# Patient Record
Sex: MALE | Race: BLACK OR AFRICAN AMERICAN | NOT HISPANIC OR LATINO | Employment: FULL TIME | URBAN - METROPOLITAN AREA
[De-identification: names, ages, dates, MRNs, and addresses within clinical notes are randomized per-mention and may not be internally consistent; named-entity substitution may affect disease eponyms.]

---

## 2022-12-25 ENCOUNTER — APPOINTMENT (EMERGENCY)
Dept: RADIOLOGY | Facility: HOSPITAL | Age: 37
End: 2022-12-25

## 2022-12-25 ENCOUNTER — HOSPITAL ENCOUNTER (EMERGENCY)
Facility: HOSPITAL | Age: 37
Discharge: HOME | End: 2022-12-26
Attending: EMERGENCY MEDICINE

## 2022-12-25 DIAGNOSIS — M79.601 RIGHT ARM PAIN: Primary | ICD-10-CM

## 2022-12-25 DIAGNOSIS — M25.521 RIGHT ELBOW PAIN: ICD-10-CM

## 2022-12-25 LAB
ALBUMIN SERPL BCP-MCNC: 4.3 G/DL (ref 3.5–5)
ALP SERPL-CCNC: 62 U/L (ref 34–104)
ALT SERPL W P-5'-P-CCNC: 14 U/L (ref 7–52)
ANION GAP SERPL CALCULATED.3IONS-SCNC: 2 MMOL/L (ref 4–13)
AST SERPL W P-5'-P-CCNC: 18 U/L (ref 13–39)
BASOPHILS # BLD AUTO: 0.02 THOUSANDS/ÂΜL (ref 0–0.1)
BASOPHILS NFR BLD AUTO: 0 % (ref 0–1)
BILIRUB SERPL-MCNC: 0.32 MG/DL (ref 0.2–1)
BUN SERPL-MCNC: 15 MG/DL (ref 5–25)
CALCIUM SERPL-MCNC: 8.9 MG/DL (ref 8.4–10.2)
CHLORIDE SERPL-SCNC: 105 MMOL/L (ref 96–108)
CK SERPL-CCNC: 187 U/L (ref 39–308)
CO2 SERPL-SCNC: 30 MMOL/L (ref 21–32)
CREAT SERPL-MCNC: 1.08 MG/DL (ref 0.6–1.3)
CRP SERPL QL: <1 MG/L
EOSINOPHIL # BLD AUTO: 0.07 THOUSAND/ÂΜL (ref 0–0.61)
EOSINOPHIL NFR BLD AUTO: 1 % (ref 0–6)
ERYTHROCYTE [DISTWIDTH] IN BLOOD BY AUTOMATED COUNT: 12 % (ref 11.6–15.1)
GFR SERPL CREATININE-BSD FRML MDRD: 88 ML/MIN/1.73SQ M
GLUCOSE SERPL-MCNC: 91 MG/DL (ref 65–140)
HCT VFR BLD AUTO: 44.6 % (ref 36.5–49.3)
HGB BLD-MCNC: 14.5 G/DL (ref 12–17)
IMM GRANULOCYTES # BLD AUTO: 0.02 THOUSAND/UL (ref 0–0.2)
IMM GRANULOCYTES NFR BLD AUTO: 0 % (ref 0–2)
LACTATE SERPL-SCNC: 0.6 MMOL/L (ref 0.5–2)
LYMPHOCYTES # BLD AUTO: 2.85 THOUSANDS/ÂΜL (ref 0.6–4.47)
LYMPHOCYTES NFR BLD AUTO: 43 % (ref 14–44)
MCH RBC QN AUTO: 29.5 PG (ref 26.8–34.3)
MCHC RBC AUTO-ENTMCNC: 32.5 G/DL (ref 31.4–37.4)
MCV RBC AUTO: 91 FL (ref 82–98)
MONOCYTES # BLD AUTO: 0.81 THOUSAND/ÂΜL (ref 0.17–1.22)
MONOCYTES NFR BLD AUTO: 12 % (ref 4–12)
NEUTROPHILS # BLD AUTO: 2.8 THOUSANDS/ÂΜL (ref 1.85–7.62)
NEUTS SEG NFR BLD AUTO: 44 % (ref 43–75)
NRBC BLD AUTO-RTO: 0 /100 WBCS
PLATELET # BLD AUTO: 326 THOUSANDS/UL (ref 149–390)
PMV BLD AUTO: 9.2 FL (ref 8.9–12.7)
POTASSIUM SERPL-SCNC: 4.4 MMOL/L (ref 3.5–5.3)
PROT SERPL-MCNC: 7.2 G/DL (ref 6.4–8.4)
RBC # BLD AUTO: 4.91 MILLION/UL (ref 3.88–5.62)
SODIUM SERPL-SCNC: 137 MMOL/L (ref 135–147)
TSH SERPL DL<=0.05 MIU/L-ACNC: 1.72 UIU/ML (ref 0.45–4.5)
WBC # BLD AUTO: 6.57 THOUSAND/UL (ref 4.31–10.16)

## 2022-12-25 RX ORDER — KETOROLAC TROMETHAMINE 30 MG/ML
30 INJECTION, SOLUTION INTRAMUSCULAR; INTRAVENOUS ONCE
Status: COMPLETED | OUTPATIENT
Start: 2022-12-25 | End: 2022-12-25

## 2022-12-25 RX ADMIN — KETOROLAC TROMETHAMINE 30 MG: 30 INJECTION, SOLUTION INTRAMUSCULAR; INTRAVENOUS at 22:53

## 2022-12-25 RX ADMIN — SODIUM CHLORIDE 1000 ML: 0.9 INJECTION, SOLUTION INTRAVENOUS at 22:53

## 2022-12-25 NOTE — Clinical Note
Redd Wren was seen and treated in our emergency department on 12/25/2022  Diagnosis:     Vinnie Lora    He may return on this date:     No work for one day     If you have any questions or concerns, please don't hesitate to call        Conchita Ly MD    ______________________________           _______________          _______________  Hospital Representative                              Date                                Time

## 2022-12-26 VITALS
HEART RATE: 78 BPM | TEMPERATURE: 98.4 F | SYSTOLIC BLOOD PRESSURE: 130 MMHG | WEIGHT: 208.34 LBS | RESPIRATION RATE: 16 BRPM | DIASTOLIC BLOOD PRESSURE: 76 MMHG | OXYGEN SATURATION: 99 %

## 2022-12-26 LAB
ATRIAL RATE: 71 BPM
CK MB SERPL-MCNC: 0.6 % (ref 0–2.5)
CK MB SERPL-MCNC: 1.2 NG/ML (ref 0.6–6.3)
P AXIS: 57 DEGREES
PR INTERVAL: 174 MS
QRS AXIS: 81 DEGREES
QRSD INTERVAL: 88 MS
QT INTERVAL: 370 MS
QTC INTERVAL: 402 MS
T WAVE AXIS: 12 DEGREES
VENTRICULAR RATE: 71 BPM

## 2022-12-26 RX ORDER — NAPROXEN 500 MG/1
500 TABLET ORAL 2 TIMES DAILY WITH MEALS
Qty: 20 TABLET | Refills: 0 | Status: SHIPPED | OUTPATIENT
Start: 2022-12-26 | End: 2023-01-05

## 2022-12-26 NOTE — ED PROVIDER NOTES
History  Chief Complaint   Patient presents with   • Arm Pain     C/O R arm pain from the elbow down starting yesterday afternoon with pain intensifying two hours ago  Patient states fingers feel swollen  -tingling     Patient is a 30year old male with increased pain and swelling of R elbow and forearm since yesterday after sleeping on it  Denies excess use of arm  No trauma  No fever  No N/V  No sob  No long travel  States he drove here  (+) paresthesias  No recent old records from this ED seen on computer system  [facility] checked on this patient and no Rx found  History provided by:  Patient   used: No    Arm Pain  Associated symptoms: myalgias    Associated symptoms: no fever, no nausea, no shortness of breath and no vomiting        None       History reviewed  No pertinent past medical history  History reviewed  No pertinent surgical history  History reviewed  No pertinent family history  I have reviewed and agree with the history as documented  E-Cigarette/Vaping   • E-Cigarette Use Never User      E-Cigarette/Vaping Substances     Social History     Tobacco Use   • Smoking status: Never   • Smokeless tobacco: Never   Vaping Use   • Vaping Use: Never used   Substance Use Topics   • Alcohol use: Not Currently   • Drug use: Never       Review of Systems   Constitutional: Negative for fever  Respiratory: Negative for shortness of breath  Gastrointestinal: Negative for nausea and vomiting  Musculoskeletal: Positive for arthralgias and myalgias  All other systems reviewed and are negative  Physical Exam  Physical Exam  Vitals and nursing note reviewed  Constitutional:       General: He is in acute distress (moderate)  HENT:      Head: Normocephalic and atraumatic  Mouth/Throat:      Mouth: Mucous membranes are moist    Eyes:      General: No scleral icterus  Cardiovascular:      Rate and Rhythm: Normal rate and regular rhythm        Heart sounds: Normal heart sounds  No murmur heard  Pulmonary:      Effort: Pulmonary effort is normal  No respiratory distress  Breath sounds: Normal breath sounds  Abdominal:      General: Bowel sounds are normal       Palpations: Abdomen is soft  Tenderness: There is no abdominal tenderness  Musculoskeletal:         General: Swelling (mild R forearm) and tenderness (R elbow and forearm) present  No deformity or signs of injury  Cervical back: Normal range of motion and neck supple  Right lower leg: No edema  Left lower leg: No edema  Comments: NVI  Skin:     General: Skin is warm and dry  Findings: No bruising, erythema or rash  Neurological:      General: No focal deficit present  Mental Status: He is alert and oriented to person, place, and time     Psychiatric:         Mood and Affect: Mood normal          Vital Signs  ED Triage Vitals [12/25/22 2230]   Temperature Pulse Respirations Blood Pressure SpO2   98 4 °F (36 9 °C) 79 18 142/76 99 %      Temp Source Heart Rate Source Patient Position - Orthostatic VS BP Location FiO2 (%)   Oral Monitor Sitting Left arm --      Pain Score       8           Vitals:    12/25/22 2230 12/26/22 0033   BP: 142/76 130/76   Pulse: 79 78   Patient Position - Orthostatic VS: Sitting          Visual Acuity      ED Medications  Medications   ketorolac (TORADOL) injection 30 mg (30 mg Intravenous Given 12/25/22 2253)   sodium chloride 0 9 % bolus 1,000 mL (0 mL Intravenous Stopped 12/25/22 2353)       Diagnostic Studies  Results Reviewed     Procedure Component Value Units Date/Time    CKMB [[id number]]  (Normal) Collected: 12/25/22 2249    Lab Status: Final result Specimen: Blood from Arm, Left Updated: 12/26/22 0040     CK-MB Index 0 6 %      CK-MB 1 2 ng/mL     TSH, 3rd generation with Free T4 reflex [[id number]]  (Normal) Collected: 12/25/22 2249    Lab Status: Final result Specimen: Blood from Arm, Left Updated: 12/25/22 2351     TSH 3RD ELOISE 1 719 uIU/mL     Narrative:      Patients undergoing fluorescein dye angiography may retain small amounts of fluorescein in the body for 48-72 hours post procedure  Samples containing fluorescein can produce falsely depressed TSH values  If the patient had this procedure,a specimen should be resubmitted post fluorescein clearance  Comprehensive metabolic panel [[id number]]  (Abnormal) Collected: 12/25/22 2249    Lab Status: Final result Specimen: Blood from Arm, Left Updated: 12/25/22 2332     Sodium 137 mmol/L      Potassium 4 4 mmol/L      Chloride 105 mmol/L      CO2 30 mmol/L      ANION GAP 2 mmol/L      BUN 15 mg/dL      Creatinine 1 08 mg/dL      Glucose 91 mg/dL      Calcium 8 9 mg/dL      AST 18 U/L      ALT 14 U/L      Alkaline Phosphatase 62 U/L      Total Protein 7 2 g/dL      Albumin 4 3 g/dL      Total Bilirubin 0 32 mg/dL      eGFR 88 ml/min/1 73sq m     Narrative:      [facility] guidelines for Chronic Kidney Disease (CKD):   •  Stage 1 with normal or high GFR (GFR > 90 mL/min/1 73 square meters)  •  Stage 2 Mild CKD (GFR = 60-89 mL/min/1 73 square meters)  •  Stage 3A Moderate CKD (GFR = 45-59 mL/min/1 73 square meters)  •  Stage 3B Moderate CKD (GFR = 30-44 mL/min/1 73 square meters)  •  Stage 4 Severe CKD (GFR = 15-29 mL/min/1 73 square meters)  •  Stage 5 End Stage CKD (GFR <15 mL/min/1 73 square meters)  Note: GFR calculation is accurate only with a steady state creatinine    CK Total with Reflex CKMB [[id number]]  (Normal) Collected: 12/25/22 2249    Lab Status: Final result Specimen: Blood from Arm, Left Updated: 12/25/22 2332     Total  U/L     Lactic acid [[id number]]  (Normal) Collected: 12/25/22 2249    Lab Status: Final result Specimen: Blood from Arm, Left Updated: 12/25/22 2332     LACTIC ACID 0 6 mmol/L     Narrative:      Result may be elevated if tourniquet was used during collection      C-reactive protein [[id number]]  (Normal) Collected: 12/25/22 2249    Lab Status: Final result Specimen: Blood from Arm, Left Updated: 12/25/22 2332     CRP <1 0 mg/L     CBC and differential [[id number]] Collected: 12/25/22 2249    Lab Status: Final result Specimen: Blood from Arm, Left Updated: 12/25/22 2300     WBC 6 57 Thousand/uL      RBC 4 91 Million/uL      Hemoglobin 14 5 g/dL      Hematocrit 44 6 %      MCV 91 fL      MCH 29 5 pg      MCHC 32 5 g/dL      RDW 12 0 %      MPV 9 2 fL      Platelets 268 Thousands/uL      nRBC 0 /100 WBCs      Neutrophils Relative 44 %      Immat GRANS % 0 %      Lymphocytes Relative 43 %      Monocytes Relative 12 %      Eosinophils Relative 1 %      Basophils Relative 0 %      Neutrophils Absolute 2 80 Thousands/µL      Immature Grans Absolute 0 02 Thousand/uL      Lymphocytes Absolute 2 85 Thousands/µL      Monocytes Absolute 0 81 Thousand/µL      Eosinophils Absolute 0 07 Thousand/µL      Basophils Absolute 0 02 Thousands/µL                  XR elbow 2 vw RIGHT   ED Interpretation by Carmenza Castillo MD (12/26 0001)   No fx or dislocation or bony abnormality read by me  XR forearm 2 views RIGHT   ED Interpretation by Carmenza Castillo MD (12/26 0001)   No fx or bony abnormality read by me                    Procedures  ECG 12 Lead Documentation Only    Date/Time: 12/25/2022 11:51 PM  Performed by: Carmenza Castillo MD  Authorized by: Carmenza Castillo MD     Indications / Diagnosis:  Right arm pain  ECG reviewed by me, the ED Provider: yes    Patient location:  ED  Previous ECG:     Previous ECG:  Unavailable  Rate:     ECG rate:  71    ECG rate assessment: normal    Rhythm:     Rhythm: sinus rhythm    Ectopy:     Ectopy: none    QRS:     QRS axis:  Normal    QRS intervals:  Normal  Conduction:     Conduction: normal    ST segments:     ST segments:  Normal  T waves:     T waves: normal               ED Course  ED Course as of 12/26/22 0126   Mon Dec 26, 2022   0121 Labs and EKG and x-rays d/w patient and pain improved  SBIRT 22yo+    Flowsheet Row Most Recent Value   SBIRT (23 yo +)    In order to provide better care to our patients, we are screening all of our patients for alcohol and drug use  Would it be okay to ask you these screening questions? Unable to answer at this time Filed at: 12/25/2022 1525                    [facility]  Number of Diagnoses or Management Options  Diagnosis management comments: DDx including but not limited to: sprain, strain, rhabdomyolysis, myositis, fracture, contusion, ACS, MI, metabolic abnormality, radiculopathy; doubt DVT or arterial occlusion or septic arthritis or cellulitis  Amount and/or Complexity of Data Reviewed  Clinical lab tests: ordered and reviewed  Tests in the radiology section of CPT®: ordered and reviewed  Decide to obtain previous medical records or to obtain history from someone other than the patient: yes  Independent visualization of images, tracings, or specimens: yes        Disposition  Final diagnoses:   Right arm pain   Right elbow pain     Time reflects when diagnosis was documented in both MDM as applicable and the Disposition within this note     Time User Action Codes Description Comment    12/26/2022  1:04 AM Chen Minium Add [M79 601] Right arm pain     12/26/2022  1:04 AM Chen Minium Add [H03 904] Right elbow pain       ED Disposition     ED Disposition   Discharge    Condition   Stable    Date/Time   Mon Dec 26, 2022  1:24 AM    Comment   Hilario Boland discharge to home/self care  Follow-up Information     Follow up With Specialties Details Why Contact Info    own primary doctor  Call in 1 day Return sooner if increased pain, worsening numbness, weakness, fever, vomiting, rash, difficulty breathing, swelling, redness             Patient's Medications   Discharge Prescriptions    NAPROXEN (NAPROSYN) 500 MG TABLET    Take 1 tablet (500 mg total) by mouth 2 (two) times a day with meals for 10 days Start Date: 12/26/2022End Date: 1/5/2023       Order Dose: 500 mg       Quantity: 20 tablet    Refills: 0       No discharge procedures on file      PDMP Review       Value Time User    PDMP Reviewed  Yes 12/25/2022 10:33 PM Lizeth Riddel MD          ED Provider  Electronically Signed by           Lizeth Riddle MD  12/26/22 6237       Lizeth Riddle MD  12/26/22 9051

## 2023-01-23 DIAGNOSIS — Z13.71 TESTING OF MALE FOR GENETIC DISEASE CARRIER STATUS: Primary | ICD-10-CM

## 2023-11-09 ENCOUNTER — HOSPITAL ENCOUNTER (EMERGENCY)
Facility: HOSPITAL | Age: 38
Discharge: HOME | End: 2023-11-10
Attending: EMERGENCY MEDICINE
Payer: COMMERCIAL

## 2023-11-09 VITALS
OXYGEN SATURATION: 97 % | BODY MASS INDEX: 29.12 KG/M2 | WEIGHT: 208 LBS | HEART RATE: 96 BPM | DIASTOLIC BLOOD PRESSURE: 84 MMHG | TEMPERATURE: 98.3 F | RESPIRATION RATE: 16 BRPM | HEIGHT: 71 IN | SYSTOLIC BLOOD PRESSURE: 132 MMHG

## 2023-11-09 DIAGNOSIS — S86.811A RUPTURE OF RIGHT PATELLAR TENDON, INITIAL ENCOUNTER: Primary | ICD-10-CM

## 2023-11-09 PROCEDURE — 99283 EMERGENCY DEPT VISIT LOW MDM: CPT

## 2023-11-09 PROCEDURE — 99284 EMERGENCY DEPT VISIT MOD MDM: CPT | Performed by: EMERGENCY MEDICINE

## 2023-11-09 RX ORDER — ACETAMINOPHEN 325 MG/1
975 TABLET ORAL ONCE
Status: COMPLETED | OUTPATIENT
Start: 2023-11-10 | End: 2023-11-09

## 2023-11-09 RX ORDER — IBUPROFEN 600 MG/1
600 TABLET ORAL ONCE
Status: COMPLETED | OUTPATIENT
Start: 2023-11-10 | End: 2023-11-09

## 2023-11-09 RX ADMIN — ACETAMINOPHEN 975 MG: 325 TABLET, FILM COATED ORAL at 23:50

## 2023-11-09 RX ADMIN — IBUPROFEN 600 MG: 600 TABLET, FILM COATED ORAL at 23:49

## 2023-11-10 ENCOUNTER — APPOINTMENT (EMERGENCY)
Dept: RADIOLOGY | Facility: HOSPITAL | Age: 38
End: 2023-11-10
Payer: COMMERCIAL

## 2023-11-10 PROCEDURE — 73564 X-RAY EXAM KNEE 4 OR MORE: CPT

## 2023-11-10 NOTE — ED PROVIDER NOTES
History  No chief complaint on file. 65-year-old male presents after he was playing basketball, and he struck his knee against another player's knee while he was jumping, came down and had a sudden severe pain in his right knee. He reports that he did not hit his head, lose consciousness, did not have any pain anywhere else, including no hip pain or ankle pain. The patient reports his knee began swelling significantly and he has had significant difficulty walking on the knee. The patient has no medical problems, takes no medications, no surgeries, no allergies, does not drink, smoke, or use drugs. Prior to Admission Medications   Prescriptions Last Dose Informant Patient Reported? Taking?   naproxen (NAPROSYN) 500 mg tablet   No No   Sig: Take 1 tablet (500 mg total) by mouth 2 (two) times a day with meals for 10 days      Facility-Administered Medications: None       No past medical history on file. No past surgical history on file. No family history on file. I have reviewed and agree with the history as documented. E-Cigarette/Vaping    E-Cigarette Use Never User      E-Cigarette/Vaping Substances     Social History     Tobacco Use    Smoking status: Never    Smokeless tobacco: Never   Vaping Use    Vaping Use: Never used   Substance Use Topics    Alcohol use: Not Currently    Drug use: Never       Review of Systems   Constitutional:  Negative for fever. HENT:  Negative for congestion. Eyes:  Negative for visual disturbance. Respiratory:  Negative for cough and shortness of breath. Cardiovascular:  Negative for chest pain. Gastrointestinal:  Negative for abdominal pain, diarrhea and vomiting. Endocrine: Negative for polyuria. Genitourinary:  Negative for dysuria and hematuria. Musculoskeletal:  Positive for arthralgias. Negative for myalgias. Neurological:  Negative for dizziness and headaches. Physical Exam  Physical Exam  Vitals and nursing note reviewed. Constitutional:       General: He is not in acute distress. Appearance: Normal appearance. HENT:      Head: Normocephalic and atraumatic. Right Ear: External ear normal.      Left Ear: External ear normal.      Mouth/Throat:      Mouth: Mucous membranes are moist.      Pharynx: Oropharynx is clear. Eyes:      Conjunctiva/sclera: Conjunctivae normal.      Pupils: Pupils are equal, round, and reactive to light. Cardiovascular:      Rate and Rhythm: Normal rate. Pulmonary:      Effort: Pulmonary effort is normal. No respiratory distress. Abdominal:      General: Abdomen is flat. There is no distension. Musculoskeletal:         General: No deformity. Normal range of motion. Cervical back: Normal range of motion. Comments: Mild to moderate swelling of the right knee, at the medial and lateral aspects of the patella, with significant tenderness in both areas, evocative maneuvers of the knee including valgus and varus straining, Lachman's test were unremarkable. No tenderness in the popliteal region, no balloting of the patella. Skin:     General: Skin is warm and dry. Neurological:      General: No focal deficit present. Mental Status: He is alert and oriented to person, place, and time. Psychiatric:         Mood and Affect: Mood normal.         Behavior: Behavior normal.         Vital Signs  ED Triage Vitals   Temp Pulse Resp BP SpO2   -- -- -- -- --      Temp src Heart Rate Source Patient Position - Orthostatic VS BP Location FiO2 (%)   -- -- -- -- --      Pain Score       --           There were no vitals filed for this visit.       Visual Acuity      ED Medications  Medications - No data to display    Diagnostic Studies  Results Reviewed       None                   No orders to display              Procedures  Procedures         ED Course           Medical Decision Making  Patient with right knee pain and swelling after a basketball injury, will be evaluated for possible knee fracture, but given the swelling I have suspicion for a bursitis, if no acute fractures are found, the patient will likely be placed in a knee immobilizer and crutches and will be encouraged to follow-up with orthopedic surgery in the next few days as an outpatient. His pain will be controlled with analgesia. Amount and/or Complexity of Data Reviewed  Radiology: ordered. Risk  OTC drugs. Prescription drug management. Disposition  Final diagnoses:   None     ED Disposition       None          Follow-up Information    None         Patient's Medications   Discharge Prescriptions    No medications on file       No discharge procedures on file.     PDMP Review         Value Time User    PDMP Reviewed  Yes 12/25/2022 10:33 PM Kp Koehler MD            ED Provider  Electronically Signed by discharge to home/self care.                    Follow-up Information       Follow up With Specialties Details Why Contact Info Additional Information    [address] [facility] Emergency Medicine Go to  As needed [address] [facility], [place], [place], [address] Schedule an appointment as soon as possible for a visit in 3 days For follow-up [address] [facility]  [address] [facility] [address] [phone] EO KZCFM ALAMWF ZIPCUUYQ YPVORN, [address] [facility], [place], [place], [address]   [address] [facility] Orthopedic Surgery Schedule an appointment as soon as possible for a visit in 3 days For follow-up [place] [id number] [phone] [address] [facility] [address] [facility] [address] Ku, [address] [facility] [address] [phone]            Discharge Medication List as of 11/10/2023  1:23 AM        CONTINUE these medications which have NOT CHANGED    Details   naproxen (NAPROSYN) 500 mg tablet Take 1 tablet (500 mg total) by mouth 2 (two) times a day with meals for 10 days, Starting Mon 12/26/2022, Until Thu 1/5/2023, Normal                 PDMP Review         Value Time User    PDMP Reviewed  Yes 11/13/2023  2:16 PM Lily Chadwick PA-C            ED Provider  Electronically Signed by             Freida Saldaña MD  11/14/23 0127

## 2023-11-13 ENCOUNTER — HOSPITAL ENCOUNTER (OUTPATIENT)
Facility: MEDICAL CENTER | Age: 38
Discharge: HOME | End: 2023-11-13
Payer: COMMERCIAL

## 2023-11-13 ENCOUNTER — APPOINTMENT (OUTPATIENT)
Dept: RADIOLOGY | Facility: MEDICAL CENTER | Age: 38
End: 2023-11-13
Payer: COMMERCIAL

## 2023-11-13 VITALS
RESPIRATION RATE: 18 BRPM | HEART RATE: 87 BPM | BODY MASS INDEX: 29.12 KG/M2 | DIASTOLIC BLOOD PRESSURE: 78 MMHG | SYSTOLIC BLOOD PRESSURE: 129 MMHG | WEIGHT: 208 LBS | HEIGHT: 71 IN

## 2023-11-13 DIAGNOSIS — S86.811A RUPTURE OF RIGHT PATELLAR TENDON, INITIAL ENCOUNTER: ICD-10-CM

## 2023-11-13 DIAGNOSIS — S86.811A RUPTURE OF RIGHT PATELLAR TENDON, INITIAL ENCOUNTER: Primary | ICD-10-CM

## 2023-11-13 PROCEDURE — G1004 CDSM NDSC: HCPCS

## 2023-11-13 PROCEDURE — 99204 OFFICE O/P NEW MOD 45 MIN: CPT | Performed by: ORTHOPAEDIC SURGERY

## 2023-11-13 PROCEDURE — 73721 MRI JNT OF LWR EXTRE W/O DYE: CPT

## 2023-11-13 RX ORDER — OXYCODONE HYDROCHLORIDE 5 MG/1
5 TABLET ORAL EVERY 6 HOURS PRN
Qty: 20 TABLET | Refills: 0 | Status: ON HOLD | OUTPATIENT
Start: 2023-11-13 | End: 2023-11-21 | Stop reason: SDUPTHER

## 2023-11-13 RX ORDER — CHLORHEXIDINE GLUCONATE ORAL RINSE 1.2 MG/ML
15 SOLUTION DENTAL ONCE
Status: CANCELLED | OUTPATIENT
Start: 2023-11-13 | End: 2023-11-13

## 2023-11-13 RX ORDER — METHOCARBAMOL 750 MG/1
750 TABLET, FILM COATED ORAL 4 TIMES DAILY PRN
Qty: 30 TABLET | Refills: 1 | Status: ON HOLD | OUTPATIENT
Start: 2023-11-13 | End: 2023-11-21 | Stop reason: SDUPTHER

## 2023-11-13 RX ORDER — CHLORHEXIDINE GLUCONATE 4 G/100ML
SOLUTION TOPICAL DAILY PRN
Status: CANCELLED | OUTPATIENT
Start: 2023-11-13

## 2023-11-13 NOTE — PROGRESS NOTES
Assessment:  1. Rupture of right patellar tendon, initial encounter  Ambulatory Referral to Orthopedic Surgery    MRI knee right  wo contrast    methocarbamol (ROBAXIN) 750 mg tablet    oxyCODONE (Roxicodone) 5 immediate release tablet    T-Rom  Post Op Knee Brace    Case request operating room: REPAIR TENDON PATELLA    CBC and differential    Ambulatory referral to [facility]    Basic metabolic panel    EKG 12 lead    Case request operating room: [address] [place] [address]    Ambulatory Referral to Physical Therapy        Patient Active Problem List   Diagnosis   • Rupture of right patellar tendon, initial encounter         Plan:    41 y.o. male with right knee basketball injury on 11/9/23 clinically consistent with patellar tendon rupture    WBAT in TROM brace locked in extension  STAT MRI of R knee ordered to evaluate extent of injuries prior to surgery  Shower chair ordered  Muscle relaxant ordered  The patient would like to proceed with right knee patellar tendon rupture. Risks and benefits of the procedure were explained to patient in detail today questions were answered to their satisfaction. Patient gave their informed consent for above procedure. The patient was seen and examined by Dr. Melissa Saul and myself. The assessment and plan were formulated by Dr. Melissa Saul and I assisted in carrying it out. Subjective:   Patient ID: Krunal Ruiz is a 41 y.o. male . HPI    Patient comes in today with regards to right knee pain. Patient is referred to us by Jennifer Guerra* for further evaluation. The patient reports that the pain been going on for 4 days. Injury or trauma prior to onset of pain: injured a flexed knee while playing basketball. Pain is located in the anterior knee. It is worsened with any flexion, and is made better with rest.  Treatments tried: brace . The pain does not radiate . Old injuries or prior surgeries: none. Numbness or tingling: none .       The following portions of the patient's history were reviewed and updated as appropriate: allergies, current medications, past family history, past social history, past surgical history and problem list.    Social History     Socioeconomic History   • Marital status: Single     Spouse name: Not on file   • Number of children: Not on file   • Years of education: Not on file   • Highest education level: Not on file   Occupational History   • Not on file   Tobacco Use   • Smoking status: Never   • Smokeless tobacco: Never   Vaping Use   • Vaping Use: Never used   Substance and Sexual Activity   • Alcohol use: Not Currently   • Drug use: Never   • Sexual activity: Not on file   Other Topics Concern   • Not on file   Social History Narrative   • Not on file     Social Determinants of Health     Financial Resource Strain: Not on file   Food Insecurity: Not on file   Transportation Needs: Not on file   Physical Activity: Not on file   Stress: Not on file   Social Connections: Not on file   Intimate Partner Violence: Not on file   Housing Stability: Not on file     History reviewed. No pertinent past medical history. History reviewed. No pertinent surgical history. No Known Allergies  Current Outpatient Medications on File Prior to Visit   Medication Sig Dispense Refill   • naproxen (NAPROSYN) 500 mg tablet Take 1 tablet (500 mg total) by mouth 2 (two) times a day with meals for 10 days 20 tablet 0     No current facility-administered medications on file prior to visit. Review of Systems      Objective:    Vitals:    11/13/23 1350   BP: 129/78   Pulse: 87   Resp: 18       Physical Exam  Musculoskeletal:      Right knee: Effusion present. Right Knee Exam     Tenderness   The patient is experiencing tenderness in the medial retinaculum, lateral retinaculum, patellar tendon and patella.     Range of Motion   Extension:  abnormal   Flexion:  abnormal     Other   Erythema: absent  Scars: absent  Sensation: normal  Pulse: present  Swelling: moderate  Effusion: effusion present    Comments:  Unable to perform SLR, palpable defect at patellar tendon             I have personally reviewed pertinent films in PACS and my interpretation is XR of R knee from 11/10/23 shows no acute fracture, anterior soft tissue swelling, high riding patella concerning for patellar tendon rupture. Procedures  No Procedures performed today      Scribe Attestation    I,:  Valentina Marino PA-C am acting as a scribe while in the presence of the attending physician.:       I,:  Kristel Menon,  personally performed the services described in this documentation    as scribed in my presence.:             Portions of the record may have been created with voice recognition software. Occasional wrong word or "sound a like" substitutions may have occurred due to the inherent limitations of voice recognition software. Read the chart carefully and recognize, using context, where substitutions have occurred.

## 2023-11-13 NOTE — H&P (VIEW-ONLY)
Assessment:  1. Rupture of right patellar tendon, initial encounter  Ambulatory Referral to Orthopedic Surgery    MRI knee right  wo contrast    methocarbamol (ROBAXIN) 750 mg tablet    oxyCODONE (Roxicodone) 5 immediate release tablet    T-Rom  Post Op Knee Brace    Case request operating room: REPAIR TENDON PATELLA    CBC and differential    Ambulatory referral to [facility]    Basic metabolic panel    EKG 12 lead    Case request operating room: [address] [place] [address]    Ambulatory Referral to Physical Therapy        Patient Active Problem List   Diagnosis   • Rupture of right patellar tendon, initial encounter         Plan:    41 y.o. male with right knee basketball injury on 11/9/23 clinically consistent with patellar tendon rupture    WBAT in TROM brace locked in extension  STAT MRI of R knee ordered to evaluate extent of injuries prior to surgery  Shower chair ordered  Muscle relaxant ordered  The patient would like to proceed with right knee patellar tendon rupture. Risks and benefits of the procedure were explained to patient in detail today questions were answered to their satisfaction. Patient gave their informed consent for above procedure. The patient was seen and examined by Dr. Casey Olivares and myself. The assessment and plan were formulated by Dr. Casey lOivares and I assisted in carrying it out. Subjective:   Patient ID: Bud Metzger is a 41 y.o. male . HPI    Patient comes in today with regards to right knee pain. Patient is referred to us by Leatha Byrne* for further evaluation. The patient reports that the pain been going on for 4 days. Injury or trauma prior to onset of pain: injured a flexed knee while playing basketball. Pain is located in the anterior knee. It is worsened with any flexion, and is made better with rest.  Treatments tried: brace . The pain does not radiate . Old injuries or prior surgeries: none. Numbness or tingling: none .       The following portions of the patient's history were reviewed and updated as appropriate: allergies, current medications, past family history, past social history, past surgical history and problem list.    Social History     Socioeconomic History   • Marital status: Single     Spouse name: Not on file   • Number of children: Not on file   • Years of education: Not on file   • Highest education level: Not on file   Occupational History   • Not on file   Tobacco Use   • Smoking status: Never   • Smokeless tobacco: Never   Vaping Use   • Vaping Use: Never used   Substance and Sexual Activity   • Alcohol use: Not Currently   • Drug use: Never   • Sexual activity: Not on file   Other Topics Concern   • Not on file   Social History Narrative   • Not on file     Social Determinants of Health     Financial Resource Strain: Not on file   Food Insecurity: Not on file   Transportation Needs: Not on file   Physical Activity: Not on file   Stress: Not on file   Social Connections: Not on file   Intimate Partner Violence: Not on file   Housing Stability: Not on file     History reviewed. No pertinent past medical history. History reviewed. No pertinent surgical history. No Known Allergies  Current Outpatient Medications on File Prior to Visit   Medication Sig Dispense Refill   • naproxen (NAPROSYN) 500 mg tablet Take 1 tablet (500 mg total) by mouth 2 (two) times a day with meals for 10 days 20 tablet 0     No current facility-administered medications on file prior to visit. Review of Systems      Objective:    Vitals:    11/13/23 1350   BP: 129/78   Pulse: 87   Resp: 18       Physical Exam  Musculoskeletal:      Right knee: Effusion present. Right Knee Exam     Tenderness   The patient is experiencing tenderness in the medial retinaculum, lateral retinaculum, patellar tendon and patella.     Range of Motion   Extension:  abnormal   Flexion:  abnormal     Other   Erythema: absent  Scars: absent  Sensation: normal  Pulse: present  Swelling: moderate  Effusion: effusion present    Comments:  Unable to perform SLR, palpable defect at patellar tendon             I have personally reviewed pertinent films in PACS and my interpretation is XR of R knee from 11/10/23 shows no acute fracture, anterior soft tissue swelling, high riding patella concerning for patellar tendon rupture. Procedures  No Procedures performed today      Scribe Attestation    I,:  Julia Branch PA-C am acting as a scribe while in the presence of the attending physician.:       I,:  Shane Mcrae DO personally performed the services described in this documentation    as scribed in my presence.:             Portions of the record may have been created with voice recognition software. Occasional wrong word or "sound a like" substitutions may have occurred due to the inherent limitations of voice recognition software. Read the chart carefully and recognize, using context, where substitutions have occurred.

## 2023-11-13 NOTE — LETTER
November 13, 2023     Patient: Areli Rodriguez  YOB: 1985  Date of Visit: 11/13/2023      To Whom it May Concern:    Areli Rodriguez is under my professional care. Susu Sullivan was seen in my office on 11/13/2023. Susu Sullivan may return to work with limitations may return on 11/14/23 sedentary duty only, please allow use of ice on the knee as needed and foot rest .    If you have any questions or concerns, please don't hesitate to call.          Sincerely,          Lisset Tang,         CC: No Recipients

## 2023-11-14 ENCOUNTER — ANESTHESIA (OUTPATIENT)
Dept: ANESTHESIOLOGY | Facility: HOSPITAL | Age: 38
End: 2023-11-14

## 2023-11-14 ENCOUNTER — ANESTHESIA EVENT (OUTPATIENT)
Dept: ANESTHESIOLOGY | Facility: HOSPITAL | Age: 38
End: 2023-11-14

## 2023-11-15 PROBLEM — Z01.818 PREOPERATIVE CLEARANCE: Status: ACTIVE | Noted: 2023-11-15

## 2023-11-15 NOTE — PATIENT INSTRUCTIONS
Contact surgical nurse  navigator with any questions regarding preoperative plan or schedule.   Stop all over the counter supplements, herbal, naturopathic  medications for 1 week prior to surgery UNLESS prescribed by your surgeon  Hold NSAIDS (i.e. advil, alleve, motrin, ibuprofen, celebrex) minimum 7 days prior to surgery  Hold Asprin minimum 7 days prior to surgery  Recommend using Tylenol ( acetaminophen ) 500mg every eight hours during the first week post discharge in conjunction with any additional pain medicine prescribed by your surgeon  Use bowel medicines prescribed by your surgeon ( colace) daily post op during the first 1-2 weeks to avoid post operative constipation issues  Call [phone] with any post discharge concerns or medical issues  The morning of surgery take only the following medication with small sip of water: none

## 2023-11-15 NOTE — PROGRESS NOTES
Internal Medicine Pre-Operative Evaluation:     Reason for Visit: Pre-operative Evaluation for Risk Stratification and Optimization    Patient ID: Liz Gorman is a 41 y.o. male. Surgery: Ruptured patella tendon of right knee  Referring Provider: Dr. Chantell Munoz      Recommendations to Proceed withSurgery    Patient is considered to be Low risk for Medium risk procedure. After evaluation and discussion with patient with emphasis that all surgery has some degree of inherent risk it is determined this procedure is of acceptable risk  medically. Patient may proceed with planned procedure      Assessment    Pre-operative Medical Evaluation for planned surgery  Patient meets preoperative quality goals as noted below  Recommendations as listed in PLAN section below  Contact surgical nurse  navigator with any questions regarding preoperative plan or schedule. Problem List Items Addressed This Visit          Musculoskeletal and Integument    Rupture of right patellar tendon, initial encounter     For upcoming repair  Cont naproxin/oxycodone as needed  Hold naproxen one week prior to surgery            Other    Preoperative clearance - Primary            Plan:     1. Further preoperative workup as follows:   - none no further testing required may proceed with surgery    2. Medication Management/Recommendations:   - not applicable patient not on any medicines    3. Patient requires further consultation with:   No Consults Required    4. Discharge Planning / Barriers to Discharge  none identified - patients has post discharge therapy plan in place, transportation arranged for discharge day, adequate family support at home to assist with discharge to home. Subjective:           History of Present Illness:     Liz Gorman is a 41 y.o. male who presents to the office today for a preoperative consultation at the request of surgeon. The patient understands this is an elective procedure and not emergent. They are electing to undergo planned procedure with an understanding that all surgery has inherent risk. They have worked with their surgeon and failed conservative treatment measures. Today they present for preoperative risk assessment and recommendations for optimization in preparation for surgery. Pt seen in surgical optimization center for upcoming proposed surgery. They have failed previous conservative measures and have elected surgical intervention. Pt meets presurgical lab and BMI optimization goals. Upon interview questioning patient is able to perform greater than 4 METs workload in daily life without any reported cardio-pulmonary symptoms. ROS: No TIA's or unusual headaches, no dysphagia. No prolonged cough. No dyspnea or chest pain on exertion. No abdominal pain, change in bowel habits, black or bloody stools. No urinary tract or BPH symptoms. Positive reported pain in arthritic joint. Positive difficulty with gait. No skin rashes or issues. Objective:    /68   Pulse 90   Ht 5' 11" (1.803 m)   Wt 93.2 kg (205 lb 6.4 oz)   SpO2 97%   BMI 28.65 kg/m²       General Appearance: no distress, conversive  HEENT: PERRLA, conjuctiva normal; oropharynx clear; mucous membranes moist;   Neck:  Supple, no lymphadenopathy or thyromegaly  Lungs: breath sounds normal, normal respiratory effort, no retractions, expiratory effort normal  CV: normal heart sounds S1/S2, PMI normal   ABD: soft non tender, no masses , no hepatic or splenomegaly  EXT: DP pulses intact, no lymphadenopathy, no edema  Skin: normal turgor, normal texture, no rash  Psych: affect normal, mood normal  Neuro: AAOx3        The following portions of the patient's history were reviewed and updated as appropriate: allergies, current medications, past family history, past medical history, past social history, past surgical history and problem list.     Past History:       History reviewed.  No pertinent past medical history. History reviewed. No pertinent surgical history. Social History     Tobacco Use   • Smoking status: Never   • Smokeless tobacco: Never   Vaping Use   • Vaping Use: Never used   Substance Use Topics   • Alcohol use: Not Currently   • Drug use: Never     History reviewed. No pertinent family history. Allergies:     No Known Allergies     Current Medications:     Current Outpatient Medications   Medication Instructions   • methocarbamol (ROBAXIN) 750 mg, Oral, 4 times daily PRN   • naproxen (NAPROSYN) 500 mg, Oral, 2 times daily with meals   • oxyCODONE (ROXICODONE) 5 mg, Oral, Every 6 hours PRN           PRE-OP WORKSHEET DATA    Assessment of Pre-Operative Risks     MLJ Quality Hard Stops:  BMI (<40) : Estimated body mass index is 28.65 kg/m² as calculated from the following:    Height as of this encounter: 5' 11" (1.803 m). Weight as of this encounter: 93.2 kg (205 lb 6.4 oz). Hgb ( >11): Lab Results   Component Value Date    HGB 14.5 12/25/2022     HbA1c (<7.5) : No results found for: "HGBA1C"  GFR (>60) (Less then 45 = Nephrology consult):  CrCl cannot be calculated (Patient's most recent lab result is older than the maximum 7 days allowed. ). Active Decompensated Chronic Conditions which would delay surgery  No acutely decompensated medical issues such as recent CVA, MI, new onset arrhythmia, severe aortic stenosis, CHF, uncontrolled COPD       Exercise Capacity: (if less the 4 mets consider functional assessment via cardiac stress testing or consultation)    Able to walk 2 blocks without symptoms?: Yes  Able to walk 1 flights without symptoms?: Yes    Assessment of intra and post operative respiratory, hemodynamic and thrombotic risks     Prior Anesthesia Reactions: No     Personal history of venous thromboembolic disease? No    History of steroid use > 5 mg for >2 weeks within last year?  No    Cardiac Risk Estimation: per the Revised Cardiac Risk Index (Circ. 100:1043, 1999), The patient's risk factors for cardiac complications include :  none    Kelly Espinoza has a in hospital cardiac risk of RCI RISK CLASS I (0 risk factors, risk of major cardiac compl. appr. 0.5%) based on RCRI calculator          Pre-Op Data Reviewed:       Laboratory Results: I have personally reviewed the pertinent laboratory results/reports     EKG:I have personally reviewed pertinent reports. . I personally reviewed and interpreted available tracings in the electronic medical record    Not indicated    OLD RECORDS: reviewed old records in the chart review section if EHR on day of visit.     Previous cardiopulmonary studies within the past year:  Echocardiogram: no  Cardiac Catheterization: no  Stress Test: no      Time of visit including pre-visit chart review, visit and post-visit coordination of plan and care , review of pre-surgical lab work, preparation and time spent documenting note in electronic medical record, time spent face-to-face in physical examination answering patient questions by care team 45 minutes             [address]

## 2023-11-16 ENCOUNTER — APPOINTMENT (OUTPATIENT)
Dept: LAB | Facility: CLINIC | Age: 38
End: 2023-11-16
Payer: COMMERCIAL

## 2023-11-16 ENCOUNTER — OFFICE VISIT (OUTPATIENT)
Age: 38
End: 2023-11-16

## 2023-11-16 VITALS
HEIGHT: 71 IN | WEIGHT: 205.4 LBS | OXYGEN SATURATION: 97 % | SYSTOLIC BLOOD PRESSURE: 124 MMHG | HEART RATE: 90 BPM | DIASTOLIC BLOOD PRESSURE: 68 MMHG | BODY MASS INDEX: 28.76 KG/M2

## 2023-11-16 DIAGNOSIS — Z01.818 PREOPERATIVE CLEARANCE: Primary | ICD-10-CM

## 2023-11-16 DIAGNOSIS — S86.811A RUPTURE OF RIGHT PATELLAR TENDON, INITIAL ENCOUNTER: ICD-10-CM

## 2023-11-16 LAB
ANION GAP SERPL CALCULATED.3IONS-SCNC: 7 MMOL/L
BASOPHILS # BLD AUTO: 0.02 THOUSANDS/ÂΜL (ref 0–0.1)
BASOPHILS NFR BLD AUTO: 1 % (ref 0–1)
BUN SERPL-MCNC: 12 MG/DL (ref 5–25)
CALCIUM SERPL-MCNC: 9.5 MG/DL (ref 8.4–10.2)
CHLORIDE SERPL-SCNC: 102 MMOL/L (ref 96–108)
CO2 SERPL-SCNC: 29 MMOL/L (ref 21–32)
CREAT SERPL-MCNC: 1 MG/DL (ref 0.6–1.3)
EOSINOPHIL # BLD AUTO: 0.05 THOUSAND/ÂΜL (ref 0–0.61)
EOSINOPHIL NFR BLD AUTO: 1 % (ref 0–6)
ERYTHROCYTE [DISTWIDTH] IN BLOOD BY AUTOMATED COUNT: 12.1 % (ref 11.6–15.1)
GFR SERPL CREATININE-BSD FRML MDRD: 96 ML/MIN/1.73SQ M
GLUCOSE SERPL-MCNC: 95 MG/DL (ref 65–140)
HCT VFR BLD AUTO: 44.4 % (ref 36.5–49.3)
HGB BLD-MCNC: 14.3 G/DL (ref 12–17)
IMM GRANULOCYTES # BLD AUTO: 0.01 THOUSAND/UL (ref 0–0.2)
IMM GRANULOCYTES NFR BLD AUTO: 0 % (ref 0–2)
LYMPHOCYTES # BLD AUTO: 1.88 THOUSANDS/ÂΜL (ref 0.6–4.47)
LYMPHOCYTES NFR BLD AUTO: 42 % (ref 14–44)
MCH RBC QN AUTO: 29.5 PG (ref 26.8–34.3)
MCHC RBC AUTO-ENTMCNC: 32.2 G/DL (ref 31.4–37.4)
MCV RBC AUTO: 92 FL (ref 82–98)
MONOCYTES # BLD AUTO: 0.49 THOUSAND/ÂΜL (ref 0.17–1.22)
MONOCYTES NFR BLD AUTO: 11 % (ref 4–12)
NEUTROPHILS # BLD AUTO: 1.98 THOUSANDS/ÂΜL (ref 1.85–7.62)
NEUTS SEG NFR BLD AUTO: 45 % (ref 43–75)
NRBC BLD AUTO-RTO: 0 /100 WBCS
PLATELET # BLD AUTO: 355 THOUSANDS/UL (ref 149–390)
PMV BLD AUTO: 9.1 FL (ref 8.9–12.7)
POTASSIUM SERPL-SCNC: 4 MMOL/L (ref 3.5–5.3)
RBC # BLD AUTO: 4.85 MILLION/UL (ref 3.88–5.62)
SODIUM SERPL-SCNC: 138 MMOL/L (ref 135–147)
WBC # BLD AUTO: 4.43 THOUSAND/UL (ref 4.31–10.16)

## 2023-11-16 PROCEDURE — 85025 COMPLETE CBC W/AUTO DIFF WBC: CPT

## 2023-11-16 PROCEDURE — 36415 COLL VENOUS BLD VENIPUNCTURE: CPT

## 2023-11-16 PROCEDURE — 93005 ELECTROCARDIOGRAM TRACING: CPT

## 2023-11-16 PROCEDURE — 80048 BASIC METABOLIC PNL TOTAL CA: CPT

## 2023-11-17 LAB
ATRIAL RATE: 73 BPM
P AXIS: 48 DEGREES
PR INTERVAL: 160 MS
QRS AXIS: 41 DEGREES
QRSD INTERVAL: 86 MS
QT INTERVAL: 368 MS
QTC INTERVAL: 405 MS
T WAVE AXIS: 17 DEGREES
VENTRICULAR RATE: 73 BPM

## 2023-11-17 PROCEDURE — 93010 ELECTROCARDIOGRAM REPORT: CPT | Performed by: INTERNAL MEDICINE

## 2023-11-20 ENCOUNTER — TELEPHONE (OUTPATIENT)
Dept: OBGYN CLINIC | Facility: HOSPITAL | Age: 38
End: 2023-11-20

## 2023-11-20 ENCOUNTER — ANESTHESIA EVENT (OUTPATIENT)
Dept: PERIOP | Facility: AMBULARY SURGERY CENTER | Age: 38
End: 2023-11-20
Payer: COMMERCIAL

## 2023-11-20 RX ORDER — MULTIVITAMIN
1 CAPSULE ORAL DAILY
COMMUNITY

## 2023-11-20 NOTE — TELEPHONE ENCOUNTER
Caller: Patient      Doctor: Kevin Gonzalez     Reason for call: Patient returning call. Has surgery scheduled tomorrow with Dr. Kevin Gonzalez. Surgery time hasn't been confirmed with patient yet.     Call back#: [phone]

## 2023-11-20 NOTE — PRE-PROCEDURE INSTRUCTIONS
Pre-Surgery Instructions:   Medication Instructions    Multiple Vitamin (multivitamin) capsule Hold day of surgery. Omega-3 Fatty Acids (FISH OIL PO) Hold day of surgery. Medication instructions for day surgery reviewed. Please use only a sip of water to take your instructed medications. Avoid all over the counter vitamins, supplements and NSAIDS for one week prior to surgery per anesthesia guidelines. Tylenol is ok to take as needed. You will receive a call one business day prior to surgery with an arrival time and hospital directions. If your surgery is scheduled on a Monday, the hospital will be calling you on the Friday prior to your surgery. If you have not heard from anyone by 8pm, please call the hospital supervisor through the hospital  at [phone]. [place]ena [place] [phone]). Do not eat or drink anything after midnight the night before your surgery, including candy, mints, lifesavers, or chewing gum. Do not drink alcohol 24hrs before your surgery. Try not to smoke at least 24hrs before your surgery. Follow the pre surgery showering instructions as listed in the [facility] Surgical Experience Booklet” or otherwise provided by your surgeon's office. Do not use a blade to shave the surgical area 1 week before surgery. It is okay to use a clean electric clippers up to 24 hours before surgery. Do not apply any lotions, creams, including makeup, cologne, deodorant, or perfumes after showering on the day of your surgery. Do not use dry shampoo, hair spray, hair gel, or any type of hair products. No contact lenses, eye make-up, or artificial eyelashes. Remove nail polish, including gel polish, and any artificial, gel, or acrylic nails if possible. Remove all jewelry including rings and body piercing jewelry. Wear causal clothing that is easy to take on and off. Consider your type of surgery. Keep any valuables, jewelry, piercings at home.  Please bring any specially ordered equipment (sling, braces) if indicated. Arrange for a responsible person to drive you to and from the hospital on the day of your surgery. Visitor Guidelines discussed. Call the surgeon's office with any new illnesses, exposures, or additional questions prior to surgery. Please reference your [facility] Surgical Experience Booklet” for additional information to prepare for your upcoming surgery.

## 2023-11-21 ENCOUNTER — ANESTHESIA (OUTPATIENT)
Dept: PERIOP | Facility: AMBULARY SURGERY CENTER | Age: 38
End: 2023-11-21
Payer: COMMERCIAL

## 2023-11-21 ENCOUNTER — HOSPITAL ENCOUNTER (OUTPATIENT)
Facility: AMBULARY SURGERY CENTER | Age: 38
Setting detail: OUTPATIENT SURGERY
Discharge: HOME | End: 2023-11-21
Attending: ORTHOPAEDIC SURGERY | Admitting: ORTHOPAEDIC SURGERY
Payer: COMMERCIAL

## 2023-11-21 VITALS
WEIGHT: 205 LBS | BODY MASS INDEX: 28.7 KG/M2 | HEART RATE: 70 BPM | HEIGHT: 71 IN | SYSTOLIC BLOOD PRESSURE: 123 MMHG | TEMPERATURE: 97.2 F | DIASTOLIC BLOOD PRESSURE: 60 MMHG | OXYGEN SATURATION: 99 % | RESPIRATION RATE: 16 BRPM

## 2023-11-21 DIAGNOSIS — S86.811A RUPTURE OF RIGHT PATELLAR TENDON, INITIAL ENCOUNTER: Primary | ICD-10-CM

## 2023-11-21 DIAGNOSIS — M25.521 RIGHT ELBOW PAIN: ICD-10-CM

## 2023-11-21 DIAGNOSIS — M79.601 RIGHT ARM PAIN: ICD-10-CM

## 2023-11-21 PROCEDURE — 27380 REPAIR OF KNEECAP TENDON: CPT | Performed by: ORTHOPAEDIC SURGERY

## 2023-11-21 PROCEDURE — 27380 REPAIR OF KNEECAP TENDON: CPT | Performed by: PHYSICIAN ASSISTANT

## 2023-11-21 RX ORDER — ONDANSETRON 2 MG/ML
4 INJECTION INTRAMUSCULAR; INTRAVENOUS ONCE AS NEEDED
Status: DISCONTINUED | OUTPATIENT
Start: 2023-11-21 | End: 2023-11-21 | Stop reason: HOSPADM

## 2023-11-21 RX ORDER — CHLORHEXIDINE GLUCONATE 4 G/100ML
SOLUTION TOPICAL DAILY PRN
Status: DISCONTINUED | OUTPATIENT
Start: 2023-11-21 | End: 2023-11-21 | Stop reason: HOSPADM

## 2023-11-21 RX ORDER — ONDANSETRON 2 MG/ML
INJECTION INTRAMUSCULAR; INTRAVENOUS AS NEEDED
Status: DISCONTINUED | OUTPATIENT
Start: 2023-11-21 | End: 2023-11-21

## 2023-11-21 RX ORDER — HYDROMORPHONE HCL/PF 1 MG/ML
0.5 SYRINGE (ML) INJECTION
Status: DISCONTINUED | OUTPATIENT
Start: 2023-11-21 | End: 2023-11-21 | Stop reason: HOSPADM

## 2023-11-21 RX ORDER — FENTANYL CITRATE/PF 50 MCG/ML
25 SYRINGE (ML) INJECTION
Status: DISCONTINUED | OUTPATIENT
Start: 2023-11-21 | End: 2023-11-21

## 2023-11-21 RX ORDER — OXYCODONE HYDROCHLORIDE 5 MG/1
5 TABLET ORAL EVERY 4 HOURS PRN
Status: DISCONTINUED | OUTPATIENT
Start: 2023-11-21 | End: 2023-11-21 | Stop reason: HOSPADM

## 2023-11-21 RX ORDER — LIDOCAINE HYDROCHLORIDE 10 MG/ML
INJECTION, SOLUTION EPIDURAL; INFILTRATION; INTRACAUDAL; PERINEURAL AS NEEDED
Status: DISCONTINUED | OUTPATIENT
Start: 2023-11-21 | End: 2023-11-21

## 2023-11-21 RX ORDER — ONDANSETRON 2 MG/ML
4 INJECTION INTRAMUSCULAR; INTRAVENOUS EVERY 6 HOURS PRN
Status: DISCONTINUED | OUTPATIENT
Start: 2023-11-21 | End: 2023-11-21 | Stop reason: HOSPADM

## 2023-11-21 RX ORDER — FENTANYL CITRATE 50 UG/ML
INJECTION, SOLUTION INTRAMUSCULAR; INTRAVENOUS AS NEEDED
Status: DISCONTINUED | OUTPATIENT
Start: 2023-11-21 | End: 2023-11-21

## 2023-11-21 RX ORDER — MAGNESIUM HYDROXIDE 1200 MG/15ML
LIQUID ORAL AS NEEDED
Status: DISCONTINUED | OUTPATIENT
Start: 2023-11-21 | End: 2023-11-21 | Stop reason: HOSPADM

## 2023-11-21 RX ORDER — OXYCODONE HYDROCHLORIDE 5 MG/1
5 TABLET ORAL EVERY 6 HOURS PRN
Qty: 20 TABLET | Refills: 0 | Status: SHIPPED | OUTPATIENT
Start: 2023-11-21 | End: 2023-12-01

## 2023-11-21 RX ORDER — NAPROXEN 500 MG/1
500 TABLET ORAL 2 TIMES DAILY WITH MEALS
Qty: 20 TABLET | Refills: 0 | Status: SHIPPED | OUTPATIENT
Start: 2023-11-21 | End: 2023-12-01

## 2023-11-21 RX ORDER — DEXAMETHASONE SODIUM PHOSPHATE 10 MG/ML
INJECTION, SOLUTION INTRAMUSCULAR; INTRAVENOUS AS NEEDED
Status: DISCONTINUED | OUTPATIENT
Start: 2023-11-21 | End: 2023-11-21

## 2023-11-21 RX ORDER — SODIUM CHLORIDE, SODIUM LACTATE, POTASSIUM CHLORIDE, CALCIUM CHLORIDE 600; 310; 30; 20 MG/100ML; MG/100ML; MG/100ML; MG/100ML
INJECTION, SOLUTION INTRAVENOUS CONTINUOUS PRN
Status: DISCONTINUED | OUTPATIENT
Start: 2023-11-21 | End: 2023-11-21

## 2023-11-21 RX ORDER — ROPIVACAINE HYDROCHLORIDE 5 MG/ML
INJECTION, SOLUTION EPIDURAL; INFILTRATION; PERINEURAL
Status: COMPLETED | OUTPATIENT
Start: 2023-11-21 | End: 2023-11-21

## 2023-11-21 RX ORDER — CEFAZOLIN SODIUM 2 G/50ML
2000 SOLUTION INTRAVENOUS ONCE
Status: COMPLETED | OUTPATIENT
Start: 2023-11-21 | End: 2023-11-21

## 2023-11-21 RX ORDER — PROPOFOL 10 MG/ML
INJECTION, EMULSION INTRAVENOUS CONTINUOUS PRN
Status: DISCONTINUED | OUTPATIENT
Start: 2023-11-21 | End: 2023-11-21

## 2023-11-21 RX ORDER — FENTANYL CITRATE/PF 50 MCG/ML
50 SYRINGE (ML) INJECTION
Status: DISCONTINUED | OUTPATIENT
Start: 2023-11-21 | End: 2023-11-21 | Stop reason: HOSPADM

## 2023-11-21 RX ORDER — DEXAMETHASONE SODIUM PHOSPHATE 4 MG/ML
INJECTION, SOLUTION INTRA-ARTICULAR; INTRALESIONAL; INTRAMUSCULAR; INTRAVENOUS; SOFT TISSUE
Status: COMPLETED | OUTPATIENT
Start: 2023-11-21 | End: 2023-11-21

## 2023-11-21 RX ORDER — CHLORHEXIDINE GLUCONATE ORAL RINSE 1.2 MG/ML
15 SOLUTION DENTAL ONCE
Status: DISCONTINUED | OUTPATIENT
Start: 2023-11-21 | End: 2023-11-21 | Stop reason: HOSPADM

## 2023-11-21 RX ORDER — PROPOFOL 10 MG/ML
INJECTION, EMULSION INTRAVENOUS AS NEEDED
Status: DISCONTINUED | OUTPATIENT
Start: 2023-11-21 | End: 2023-11-21

## 2023-11-21 RX ORDER — MIDAZOLAM HYDROCHLORIDE 2 MG/2ML
INJECTION, SOLUTION INTRAMUSCULAR; INTRAVENOUS
Status: COMPLETED | OUTPATIENT
Start: 2023-11-21 | End: 2023-11-21

## 2023-11-21 RX ORDER — SODIUM CHLORIDE, SODIUM LACTATE, POTASSIUM CHLORIDE, CALCIUM CHLORIDE 600; 310; 30; 20 MG/100ML; MG/100ML; MG/100ML; MG/100ML
50 INJECTION, SOLUTION INTRAVENOUS CONTINUOUS
Status: DISCONTINUED | OUTPATIENT
Start: 2023-11-21 | End: 2023-11-21 | Stop reason: HOSPADM

## 2023-11-21 RX ORDER — METHOCARBAMOL 750 MG/1
750 TABLET, FILM COATED ORAL 4 TIMES DAILY PRN
Qty: 60 TABLET | Refills: 0 | Status: SHIPPED | OUTPATIENT
Start: 2023-11-21

## 2023-11-21 RX ORDER — HYDROMORPHONE HCL/PF 1 MG/ML
0.4 SYRINGE (ML) INJECTION
Status: DISCONTINUED | OUTPATIENT
Start: 2023-11-21 | End: 2023-11-21 | Stop reason: HOSPADM

## 2023-11-21 RX ORDER — OXYCODONE HYDROCHLORIDE 5 MG/1
10 TABLET ORAL EVERY 4 HOURS PRN
Status: DISCONTINUED | OUTPATIENT
Start: 2023-11-21 | End: 2023-11-21 | Stop reason: HOSPADM

## 2023-11-21 RX ORDER — HYDROMORPHONE HCL/PF 1 MG/ML
SYRINGE (ML) INJECTION AS NEEDED
Status: DISCONTINUED | OUTPATIENT
Start: 2023-11-21 | End: 2023-11-21

## 2023-11-21 RX ORDER — MIDAZOLAM HYDROCHLORIDE 2 MG/2ML
INJECTION, SOLUTION INTRAMUSCULAR; INTRAVENOUS AS NEEDED
Status: DISCONTINUED | OUTPATIENT
Start: 2023-11-21 | End: 2023-11-21

## 2023-11-21 RX ADMIN — CEFAZOLIN SODIUM 2000 MG: 2 SOLUTION INTRAVENOUS at 13:33

## 2023-11-21 RX ADMIN — PROPOFOL 200 MG: 10 INJECTION, EMULSION INTRAVENOUS at 13:30

## 2023-11-21 RX ADMIN — HYDROMORPHONE HYDROCHLORIDE 0.4 MG: 1 INJECTION, SOLUTION INTRAMUSCULAR; INTRAVENOUS; SUBCUTANEOUS at 15:32

## 2023-11-21 RX ADMIN — FENTANYL CITRATE 50 MCG: 50 INJECTION INTRAMUSCULAR; INTRAVENOUS at 13:46

## 2023-11-21 RX ADMIN — HYDROMORPHONE HYDROCHLORIDE 0.5 MG: 1 INJECTION, SOLUTION INTRAMUSCULAR; INTRAVENOUS; SUBCUTANEOUS at 13:48

## 2023-11-21 RX ADMIN — SODIUM CHLORIDE, SODIUM LACTATE, POTASSIUM CHLORIDE, AND CALCIUM CHLORIDE: .6; .31; .03; .02 INJECTION, SOLUTION INTRAVENOUS at 13:15

## 2023-11-21 RX ADMIN — LIDOCAINE HYDROCHLORIDE 50 MG: 10 INJECTION, SOLUTION EPIDURAL; INFILTRATION; INTRACAUDAL; PERINEURAL at 13:30

## 2023-11-21 RX ADMIN — SODIUM CHLORIDE, SODIUM LACTATE, POTASSIUM CHLORIDE, AND CALCIUM CHLORIDE: .6; .31; .03; .02 INJECTION, SOLUTION INTRAVENOUS at 13:54

## 2023-11-21 RX ADMIN — ONDANSETRON 4 MG: 2 INJECTION INTRAMUSCULAR; INTRAVENOUS at 13:33

## 2023-11-21 RX ADMIN — DEXAMETHASONE SODIUM PHOSPHATE 4 MG: 4 INJECTION INTRA-ARTICULAR; INTRALESIONAL; INTRAMUSCULAR; INTRAVENOUS; SOFT TISSUE at 12:40

## 2023-11-21 RX ADMIN — FENTANYL CITRATE 50 MCG: 50 INJECTION INTRAMUSCULAR; INTRAVENOUS at 13:33

## 2023-11-21 RX ADMIN — FENTANYL CITRATE 50 MCG: 50 INJECTION INTRAMUSCULAR; INTRAVENOUS at 15:21

## 2023-11-21 RX ADMIN — ROPIVACAINE HYDROCHLORIDE 20 ML: 5 INJECTION EPIDURAL; INFILTRATION; PERINEURAL at 12:40

## 2023-11-21 RX ADMIN — FENTANYL CITRATE 50 MCG: 50 INJECTION INTRAMUSCULAR; INTRAVENOUS at 15:11

## 2023-11-21 RX ADMIN — PROPOFOL 150 MG: 10 INJECTION, EMULSION INTRAVENOUS at 13:31

## 2023-11-21 RX ADMIN — MIDAZOLAM 2 MG: 1 INJECTION INTRAMUSCULAR; INTRAVENOUS at 12:38

## 2023-11-21 RX ADMIN — HYDROMORPHONE HYDROCHLORIDE 0.4 MG: 1 INJECTION, SOLUTION INTRAMUSCULAR; INTRAVENOUS; SUBCUTANEOUS at 15:39

## 2023-11-21 RX ADMIN — PROPOFOL 70 MCG/KG/MIN: 10 INJECTION, EMULSION INTRAVENOUS at 13:37

## 2023-11-21 RX ADMIN — OXYCODONE HYDROCHLORIDE 10 MG: 5 TABLET ORAL at 16:30

## 2023-11-21 RX ADMIN — HYDROMORPHONE HYDROCHLORIDE 0.5 MG: 1 INJECTION, SOLUTION INTRAMUSCULAR; INTRAVENOUS; SUBCUTANEOUS at 14:35

## 2023-11-21 RX ADMIN — HYDROMORPHONE HYDROCHLORIDE 0.5 MG: 1 INJECTION, SOLUTION INTRAMUSCULAR; INTRAVENOUS; SUBCUTANEOUS at 14:17

## 2023-11-21 RX ADMIN — DEXAMETHASONE SODIUM PHOSPHATE 5 MG: 10 INJECTION, SOLUTION INTRAMUSCULAR; INTRAVENOUS at 13:33

## 2023-11-21 NOTE — INTERVAL H&P NOTE
H&P reviewed. After examining the patient I find no changes in the patients condition since the H&P had been written.     Vitals:    11/21/23 1235   BP: 117/86   Pulse: 72   Resp: 18   Temp: 97.8 °F (36.6 °C)   SpO2: 98%

## 2023-11-21 NOTE — ANESTHESIA POSTPROCEDURE EVALUATION
Post-Op Assessment Note    CV Status:  Stable    Pain management: adequate       Mental Status:  Lethargic and sleepy   Hydration Status:  Stable   PONV Controlled:  None   Airway Patency:  Patent     Post Op Vitals Reviewed: Yes    No anethesia notable event occurred.     Staff: CRNA               BP   106/63   Temp      Pulse  93   Resp      SpO2   98

## 2023-11-21 NOTE — DISCHARGE INSTR - AVS FIRST PAGE
Discharge Instructions - Orthopedics  Ekaterina Ku 41 y.o. male MRN: [id number]  Unit/Bed#: Preop 12    Weight Bearing Status:                                           Weightbearing as tolerated right lower extremity with T ROM brace locked in extension, no weightbearing without brace on, no active range of motion of the knee    DVT prophylaxis:  Aspirin 325 mg once daily with food for 30 days after surgery this is to help prevent blood clot    Pain:  Continue analgesics as directed. Take Antiinflammatories like alleve advil as you primary source of pain relief. Use the narcotic pain relief for breakthrough pain that is not controlled by the alleve or advil. Use the methocarbamol for muscle spasm as directed. Dressing Instructions: May remove dressings 4 days after surgery and shower as long as there is no drainage coming from the incision. Please clean the wound after showers or otherwise once daily with rubbing alcohol gently. Please don't cover if not draining to allow incision to dry. Appt Instructions: If you do not have your appointment, please call the clinic at [phone]  Otherwise follow up as scheduled. Contact the office sooner if you experience any increased numbness/tingling in the extremities.

## 2023-11-21 NOTE — ANESTHESIA PROCEDURE NOTES
Peripheral Block    Patient location during procedure: holding area  Start time: 11/21/2023 12:40 PM  Reason for block: at surgeon's request and post-op pain management  Staffing  Performed by: Karla Dallas MD  Authorized by: Karla Dallas MD    Preanesthetic Checklist  Completed: patient identified, IV checked, site marked, risks and benefits discussed, surgical consent, monitors and equipment checked, pre-op evaluation and timeout performed  Peripheral Block  Prep: ChloraPrep  Patient monitoring: frequent blood pressure checks, continuous pulse oximetry and heart rate  Block type: Adductor Canal  Laterality: right  Injection technique: single-shot  Procedures: ultrasound guided, Ultrasound guidance required for the procedure to increase accuracy and safety of medication placement and decrease risk of complications.   Ultrasound permanent image savedropivacaine (NAROPIN) 0.5 % injection 20 mL - Perineural   20 mL - 11/21/2023 12:40:00 PM  dexamethasone (DECADRON) injection 4 mg - Perineural   4 mg - 11/21/2023 12:40:00 PM  midazolam (VERSED) injection 0.5 mg - Intravenous   2 mg - 11/21/2023 12:38:00 PM  Needle  Needle type: Stimuplex   Needle length: 4 in  Needle localization: anatomical landmarks and ultrasound guidance  Assessment  Injection assessment: incremental injection, frequent aspiration, injected with ease, negative aspiration, negative for heart rate change, no paresthesia on injection, no symptoms of intraneural/intravenous injection and needle tip visualized at all times  Paresthesia pain: none  Post-procedure:  site cleaned  patient tolerated the procedure well with no immediate complications

## 2023-11-21 NOTE — OP NOTE
OPERATIVE REPORT  PATIENT NAME: Lucero Pillai  : 1985  MRN: [id number]  Pt Location:  [facility] OR ROOM 06    Surgery Date: 2023    Surgeon(s) and Role:     * Julio Parra DO - Primary   [facility] utilized during the case for assistance with positioning draping retraction closure and dressing application    Preop Diagnosis:  Rupture of right patellar tendon, initial encounter [S86.811A]    Post-Op Diagnosis Codes:     * Rupture of right patellar tendon, initial encounter [W00.063G]    Procedure(s):  Right - REPAIR TENDON PATELLA    Specimens:  * No specimens in log *    Estimated Blood Loss:   Minimal    Drains:  * No LDAs found *    Anesthesia Type:   General w/ Regional     Operative Indications:  Rupture of right patellar tendon, initial encounter [Q87.632K]    Operative Findings:  Rupture proximal portion of the patella tendon    Complications:   None    Knee Technique: Suture (direct) Repair  Knee Approach: other    Procedure and Technique:  Patient was identified in the preoperative area the right lower extremities marked consent H&P had been reviewed. The patient was then brought back to the operative suite where is intubated sedated and the right lower extremities prepped and draped in sterile fashion. To bring the patient back the patient received the local block from anesthesia. A tourniquet was applied to the right thigh prior to prepping and draping. A proper time was allowed to lift the prep dry. That was 3 minutes in total, prior to application of drapes. Esmarch was used to exsanguinate the lower extremity tourniquet was turned up to 275 mmHg. Incision was made in line the patella. Section was taken down to the tendon as well as the patella. Incision was from just superior to the patella approximately 1 inch down to the tibial tubercle. We dissected through the skin down to the peritenon with Metzenbaum scissors and tenotomy scissors trying to spare the peritenon. We are able to dissect both medially and laterally along the gutters and superior inferiorly to identify any retinacular tear and also the borders of her patellar tendon. Once we identified her patellar tendon we dissected some the peritenon on the off of the tendon for later closure. We freshened up our proximal aspect of telemetry tendon. We also moved to our patella and debrided any soft tissue off the inferior pole of the patella. We used a rongeur and curette to do so. We found some good bleeding cancellous bone at that time. Using [place] suture technique we passed 5 hitches from proximal to distal and distal to proximal for our 2 medial strands and then from proximal to distal and distal to proximal for our 2 lateral strands both were at least 5 hitches of [place] suture technique. We used a 330-second drill to drill from distal to proximal through the patella we used a [employer] to protect the soft tissue as a passed through the superior pole the patella and through the quadricep tendon. We drilled the medial hole first passed our most medial strand then drilled our middle hole and passed our 2 medial strands or midline strands. We then drilled our lateral hole and passed our lateral strand. We used a Beath pin to pass the sutures. Then we used a free needle to pass our 2 midline strands to 1 to our medial strand and 1 to our lateral strands. We then placed the knee in hyperextension close down the retinaculum with #5 Ethibond to sutures on either side there is minimal tears on the retinaculum but we reinforced this with the 2 Ethibond figure-of-eight sutures. We then approximated the patellar tendon held in hyperextension and then tied down our 2 medial strands and our 2 lateral strands against the patella using the surgeons not and hemostat to hold it down to the bone. We then do 5/2 hitches and cut off the excess.   We then ranged the knee found that there was no gapping between the tendon and the patella itself. We then irrigated the wound. Closed her peritenon to the best of her ability with 2-0 Vicryl. We also reinforced our repair with some 0 Vicryl over the proximal incision with some of the soft tissue. We then closed our subcutaneous tissue with 2-0 Vicryl and then running strata fix Monocryl under the skin. He was applied to the skin Xeroform 4 x 4 ABD Webril Ace wrap and the patient was placed back in a T ROM in extension. I was present for all critical portions of the procedure. and A qualified resident physician was not available.     Patient Disposition:  PACU             SIGNATURE: Pb Robertson DO  DATE: November 21, 2023  TIME: 1:05 PM

## 2023-11-21 NOTE — ANESTHESIA PREPROCEDURE EVALUATION
Procedure:  REPAIR TENDON PATELLA (Right: Knee)    Relevant Problems   No relevant active problems        Physical Exam    Airway    Mallampati score: II  TM Distance: >3 FB  Neck ROM: full     Dental   No notable dental hx     Cardiovascular  No weak pulses    Pulmonary   No stridor    Other Findings        Anesthesia Plan  ASA Score- 2     Anesthesia Type- general with ASA Monitors. Additional Monitors:     Airway Plan: LMA. Plan Factors-    Chart reviewed. EKG reviewed. Existing labs reviewed. Patient summary reviewed. Induction- intravenous. Postoperative Plan- Plan for postoperative opioid use. Planned trial extubation    Informed Consent- Anesthetic plan and risks discussed with patient. I personally reviewed this patient with the CRNA. Discussed and agreed on the Anesthesia Plan with the CRNA. Kalli Bullard

## 2023-11-27 NOTE — ANESTHESIA PROCEDURE NOTES
Anesthesia Notable Event    Date/Time: 11/21/2023 3:36 PM    Performed by: Abilio Tucker MD  Authorized by: Abilio Tucker MD

## 2023-12-01 ENCOUNTER — OFFICE VISIT (OUTPATIENT)
Dept: OBGYN CLINIC | Facility: CLINIC | Age: 38
End: 2023-12-01

## 2023-12-01 VITALS — BODY MASS INDEX: 28.7 KG/M2 | WEIGHT: 205 LBS | HEIGHT: 71 IN

## 2023-12-01 DIAGNOSIS — S86.811A RUPTURE OF RIGHT PATELLAR TENDON, INITIAL ENCOUNTER: Primary | ICD-10-CM

## 2023-12-01 PROCEDURE — 99024 POSTOP FOLLOW-UP VISIT: CPT | Performed by: ORTHOPAEDIC SURGERY

## 2023-12-01 NOTE — PROGRESS NOTES
Assessment:   Status Post  Repair Tendon Patella - Right on 11/21/2023 doing well    Plan:   Weight bearing: As tolerated with T ROM brace locked in extension  Pain control as needed  PT he will begin next week with passive range of motion only as specified in the PT order and progress 10 degrees weekly as tolerated  DVT prophylaxis: Heparin 325 mg daily for the next 2 weeks. He reports he has not been taking this for the past 2 weeks I did emphasize to him the importance of taking this to prevent a DVT given that he is not actively flexing extending the knee. Follow Up:  4  week(s)    To Do Next Visit:  Begin AROM and weaning out of TROM brace, progress to beginning strengthening with PT      CHIEF COMPLAINT:  No chief complaint on file. SUBJECTIVE:  Dieudonne Cerrato is a 41y.o. year old male who presents for follow up after Repair Tendon Patella - Right. Today patient has no complaints doing well. Pt denies any fevers or chills. Denies any numbness or tingling.       PHYSICAL EXAMINATION:    MUSCULOSKELETAL EXAMINATION: right knee  General: Alert and oriented x 3, WNWD, NAD  Incision: Clean, dry, intact, healing well  No andrew-incisional erythema  no drainage or purulence  No fluctuance or swelling   Range of Motion: As expected  Neurovascular status: Sensation intact to light touch L1-S1   Motor intact L1-S1  Pulses:  intact        STUDIES REVIEWED:  none      PROCEDURES PERFORMED:  Procedures  No Procedures performed today

## 2023-12-06 ENCOUNTER — EVALUATION (OUTPATIENT)
Dept: PHYSICAL THERAPY | Facility: CLINIC | Age: 38
End: 2023-12-06
Payer: COMMERCIAL

## 2023-12-06 DIAGNOSIS — S86.811A RUPTURE OF RIGHT PATELLAR TENDON, INITIAL ENCOUNTER: ICD-10-CM

## 2023-12-06 PROCEDURE — 97110 THERAPEUTIC EXERCISES: CPT | Performed by: PHYSICAL THERAPIST

## 2023-12-06 PROCEDURE — 97162 PT EVAL MOD COMPLEX 30 MIN: CPT | Performed by: PHYSICAL THERAPIST

## 2023-12-06 NOTE — PROGRESS NOTES
PT Evaluation     Today's date: 2023  Patient name: Trever Mccurdy  : 1985  MRN: [id number]  Referring provider: Pelon Roberson  Dx:   Encounter Diagnosis     ICD-10-CM    1. Rupture of right patellar tendon, initial encounter  S86.811A Ambulatory Referral to Physical Therapy                     Assessment  Assessment details: Trever Mccurdy is a 41 y.o. male with s/p repair of right patellar tendon rupture. He presents with pain, decreased strength, decreased ROM, and ambulatory dysfunction  Due to these impairments, patient has difficulty performing a/iadls, recreational activities and engaging in social activities. Patient's clinical presentation is consistent with their referring diagnosis. Patient would benefit from skilled physical therapy to address their aforementioned impairments, improve their level of function and to improve their overall quality of life. has been given a home exercise program and is in agreement with the plan of care. Thank you for your referral.     Impairments: abnormal gait, abnormal muscle tone, abnormal or restricted ROM, activity intolerance, impaired balance, impaired physical strength, lacks appropriate home exercise program and pain with function    Symptom irritability: moderateUnderstanding of Dx/Px/POC: excellent  Goals  ST Goals - 2-4 weeks  1. Patient will report decreased pain with activity by at least 2 points within 4 weeks  2. Patient will improve ROM 10 degrees / week within 4 weeks  3. Patient will ambulate without P-ROM brace in 4 weeks  4. Patient will perform IADLs without pain in 4-6 weeks  5. Patient will increase strength by 25% in 48 weeks  6. Patient will sleep in bed in 2 weeks    LT Goals - Discharge  1. Patient will improve FOTO score to maximum stated or greater by discharge  2. Patient will return to preferred recreational activity without significant pain increase by discharge   3.   Patient will return to all work related activities without pain by discharge      Plan  Patient would benefit from: skilled physical therapy  Referral necessary: No  Planned therapy interventions: joint mobilization, manual therapy, neuromuscular re-education, strengthening, stretching, therapeutic activities, therapeutic exercise and home exercise program  Frequency: 2x week  Duration in visits: 20  Duration in weeks: 20  Plan of Care beginning date: 2023  Plan of Care expiration date: 3/7/2024  Treatment plan discussed with: patient        Subjective Evaluation    History of Present Illness  Mechanism of injury: Patient reports that he was playing basketball, and jumped in the air, hitting another player. He heard a pop in the R knee. He drove himself to the ER from [place] to [place]. He was put in a brace and given crutches and referred to ortho outpatient. He was referred to ortho who diagnosed a patellar tendon rupture. He had surgery to repair the tendon on . He presents to PT today in his P ROM brace. CC:  He reports that he has little pain. He states that he might get quick sharp pains. He is not taking any pain medication at this time. Function:  He has been sleeping on the couch and is going to attempt to sleep in bed tonight. He did drive himself. To PT today. He is taking the brace off only to shower. He is going up the stairs once or twice a day. He works for [employer] but is not working now. He will be able to sit at work when he returns. He would like to return to playing sports. He would like to be able to workout.             Recurrent probem    Quality of life: excellent    Patient Goals  Patient goals for therapy: decreased edema, decreased pain, increased motion, increased strength, independence with ADLs/IADLs and return to sport/leisure activities    Pain  Current pain ratin  At best pain ratin  At worst pain rating: 3  Quality: sharp  Relieving factors: ice  Progression: improved    Social Support  Steps to enter house: yes  Stairs in house: yes       Diagnostic Tests  X-ray: abnormal  MRI studies: abnormal      Objective     Observations     Right Knee   Positive for atrophy, effusion and incision. Additional Observation Details  Well healing incision. No signs of infection. Moderate effusion noted. Patient in full extension in P ROM brace  (-)  pitting edema      Tenderness   Left Knee   Tenderness in the patellar tendon. Active Range of Motion   Left Knee   Flexion: 130 degrees   Prone flexion: 0 degrees     Additional Active Range of Motion Details  NT    Passive Range of Motion     Right Knee   Flexion: 28 degrees   Extension: -9 degrees     Mobility   Patellar Mobility:     Right Knee   Hypomobile: medial, lateral, superior and inferior     Additional Mobility Details  Effusion appears to be limiting flexibility. Strength/Myotome Testing     Left Knee   Normal strength    Swelling     Left Knee Girth Measurement (cm)   Joint line: 37 cm  10 cm above joint line: 44.5 cm  10 cm below joint line: 39 cm    Right Knee Girth Measurement (cm)   Joint line: 44.5 cm  10 cm above joint line: 45 cm  10 cm below joint line: 38 cm             Precautions:  0 - 40 degrees initially then add 10 degrees each week. Access Code: [email or web address]  URL: [email or web address]  Date: 12/06/2023  Prepared by: Vega Edmund    Exercises  - Sitting Heel Slide with Towel  - 2 x daily - 7 x weekly - 1 sets - 5 reps - 20 hold  - Supine Active Ankle Pumps  - 2 x daily - 7 x weekly - 3 sets - 10 reps  - Long Sitting Calf Stretch with Strap  - 2 x daily - 7 x weekly - 1 sets - 5 reps - 20 hold  - Hooklying Hamstring Stretch with Strap  - 2 x daily - 7 x weekly - 1 sets - 5 reps - 20 hold    Manuals 12/6            PROM prone                                                    Neuro Re-Ed Ther Ex             Heel slides             Ankle Pumps             Ankle circles             Hamstring stretch             Calf stretch                                                    Ther Activity                                       Gait Training                                       Modalities

## 2023-12-12 ENCOUNTER — OFFICE VISIT (OUTPATIENT)
Dept: PHYSICAL THERAPY | Facility: CLINIC | Age: 38
End: 2023-12-12
Payer: COMMERCIAL

## 2023-12-12 DIAGNOSIS — S86.811A RUPTURE OF RIGHT PATELLAR TENDON, INITIAL ENCOUNTER: Primary | ICD-10-CM

## 2023-12-12 PROCEDURE — 97140 MANUAL THERAPY 1/> REGIONS: CPT

## 2023-12-12 PROCEDURE — 97110 THERAPEUTIC EXERCISES: CPT

## 2023-12-12 NOTE — PROGRESS NOTES
Daily Note     Today's date: 2023  Patient name: Karen Lewis  : 1985  MRN: [id number]  Referring provider: Anitha He  Dx:   Encounter Diagnosis     ICD-10-CM    1. Rupture of right patellar tendon, initial encounter  S86.811A                      Subjective: Patient states that he is compliant with HEP      Objective: See treatment diary below      Assessment: Tolerated treatment well. Patient  improved knee flexion by 10 degrees with PROM knee flexion      Plan: Continue per plan of care. Precautions:  0 - 40 degrees initially then add 10 degrees each week. Access Code: [email or web address]  URL: [email or web address]  Date: 2023  Prepared by: Yadira Mckinney    Exercises  - Sitting Heel Slide with Towel  - 2 x daily - 7 x weekly - 1 sets - 5 reps - 20 hold  - Supine Active Ankle Pumps  - 2 x daily - 7 x weekly - 3 sets - 10 reps  - Long Sitting Calf Stretch with Strap  - 2 x daily - 7 x weekly - 1 sets - 5 reps - 20 hold  - Hooklying Hamstring Stretch with Strap  - 2 x daily - 7 x weekly - 1 sets - 5 reps - 20 hold    Manuals            PROM prone 28 38 KD                                                  Neuro Re-Ed                                                                                                        Ther Ex             Heel slides  Supine PROM 10"x 5           Ankle Pumps  HEP           Ankle circles  HEP           Hamstring stretch             Calf stretch  Seated 20" x 5                                                  Ther Activity                                       Gait Training                                       Modalities

## 2023-12-14 ENCOUNTER — OFFICE VISIT (OUTPATIENT)
Dept: PHYSICAL THERAPY | Facility: CLINIC | Age: 38
End: 2023-12-14
Payer: COMMERCIAL

## 2023-12-14 DIAGNOSIS — S86.811A RUPTURE OF RIGHT PATELLAR TENDON, INITIAL ENCOUNTER: Primary | ICD-10-CM

## 2023-12-14 PROCEDURE — 97140 MANUAL THERAPY 1/> REGIONS: CPT

## 2023-12-14 PROCEDURE — 97110 THERAPEUTIC EXERCISES: CPT

## 2023-12-14 NOTE — PROGRESS NOTES
Daily Note     Today's date: 2023  Patient name: Eliana Cintron  : 1985  MRN: [id number]  Referring provider: Nathaly Hussein  Dx:   Encounter Diagnosis     ICD-10-CM    1. Rupture of right patellar tendon, initial encounter  S86.811A                      Subjective: Maximally compliant with HEP. e      Objective: See treatment diary below      Assessment: Tolerated treatment well. Patient exhibited good technique with therapeutic exercises      Plan: Continue per plan of care. Precautions:  0 - 40 degrees initially then add 10 degrees each week. Access Code: [email or web address]  URL: [email or web address]  Date: 2023  Prepared by: Shayy Forrester    Exercises  - Sitting Heel Slide with Towel  - 2 x daily - 7 x weekly - 1 sets - 5 reps - 20 hold  - Supine Active Ankle Pumps  - 2 x daily - 7 x weekly - 3 sets - 10 reps  - Long Sitting Calf Stretch with Strap  - 2 x daily - 7 x weekly - 1 sets - 5 reps - 20 hold  - Hooklying Hamstring Stretch with Strap  - 2 x daily - 7 x weekly - 1 sets - 5 reps - 20 hold    Manuals           PROM prone 28 38 KD KD                                                 Neuro Re-Ed                                                                                                        Ther Ex             Heel slides  Supine PROM 10"x 5 Supine 10" x 10           Ankle Pumps  HEP           Ankle circles  HEP           Hamstring stretch             Calf stretch  Seated 20" x 5 Supine 20"  x5           Seated knee flexion hang- PT assist   10" x 10                                     Ther Activity                                       Gait Training                                       Modalities

## 2023-12-19 ENCOUNTER — OFFICE VISIT (OUTPATIENT)
Dept: PHYSICAL THERAPY | Facility: CLINIC | Age: 38
End: 2023-12-19
Payer: COMMERCIAL

## 2023-12-19 DIAGNOSIS — S86.811A RUPTURE OF RIGHT PATELLAR TENDON, INITIAL ENCOUNTER: Primary | ICD-10-CM

## 2023-12-19 PROCEDURE — 97140 MANUAL THERAPY 1/> REGIONS: CPT | Performed by: PHYSICAL THERAPIST

## 2023-12-19 PROCEDURE — 97110 THERAPEUTIC EXERCISES: CPT | Performed by: PHYSICAL THERAPIST

## 2023-12-19 NOTE — PROGRESS NOTES
"Daily Note     Today's date: 2023  Patient name: Lorenzo Hebert  : 1985  MRN: [id number]  Referring provider: Quentin Jimenez PA-C  Dx:   Encounter Diagnosis     ICD-10-CM    1. Rupture of right patellar tendon, initial encounter  S86.811A                      Subjective: Patient reports that the glue appears to be coming off at this time.       Objective: See treatment diary below      Assessment: Tolerated treatment well. Patient would benefit from continued PT  Pt continues to struggle to achieve PROM goals.      Plan: Continue per plan of care.      Precautions:  0 - 40 degrees initially then add 10 degrees each week.      Access Code: [email or web address]  URL: [email or web address]  Date: 2023  Prepared by: Kalpana Nunes    Exercises  - Sitting Heel Slide with Towel  - 2 x daily - 7 x weekly - 1 sets - 5 reps - 20 hold  - Supine Active Ankle Pumps  - 2 x daily - 7 x weekly - 3 sets - 10 reps  - Long Sitting Calf Stretch with Strap  - 2 x daily - 7 x weekly - 1 sets - 5 reps - 20 hold  - Hooklying Hamstring Stretch with Strap  - 2 x daily - 7 x weekly - 1 sets - 5 reps - 20 hold    Manuals          PROM prone 28 38 KD KD 10'                      PROM mmt    48deg         Patella mobs    5'         IASTM    nv         (-) pressure IASTM    nv                                                                                       Ther Ex             Heel slides  Supine PROM 10\"x 5 Supine 10\" x 10  10 x :10         Ankle Pumps  HEP           Ankle circles  HEP           Hamstring stretch             Calf stretch  Seated 20\" x 5 Supine 20\"  x5           Seated knee flexion hang- PT assist   10\" x 10  5 x :30         Wall slides -PROM    5 x :30                      Ther Activity                                       Gait Training                                       Modalities                                                "

## 2023-12-21 ENCOUNTER — OFFICE VISIT (OUTPATIENT)
Dept: PHYSICAL THERAPY | Facility: CLINIC | Age: 38
End: 2023-12-21
Payer: COMMERCIAL

## 2023-12-21 DIAGNOSIS — S86.811A RUPTURE OF RIGHT PATELLAR TENDON, INITIAL ENCOUNTER: Primary | ICD-10-CM

## 2023-12-21 PROCEDURE — 97110 THERAPEUTIC EXERCISES: CPT | Performed by: PHYSICAL THERAPIST

## 2023-12-21 PROCEDURE — 97140 MANUAL THERAPY 1/> REGIONS: CPT | Performed by: PHYSICAL THERAPIST

## 2023-12-21 NOTE — PROGRESS NOTES
"Daily Note     Today's date: 2023  Patient name: Lorenzo Hebert  : 1985  MRN: [id number]  Referring provider: Quentin Jimenez PA-C  Dx:   Encounter Diagnosis     ICD-10-CM    1. Rupture of right patellar tendon, initial encounter  S86.811A                      Subjective: Patient reports no change in sx since LV.        Objective: See treatment diary below      Assessment: Tolerated treatment well. Patient would benefit from continued PT      Plan: Continue per plan of care.      Precautions:  0 - 40 degrees initially then add 10 degrees each week.      Access Code: [email or web address]  URL: [email or web address]  Date: 2023  Prepared by: Kalpana Nunes    Exercises  - Sitting Heel Slide with Towel  - 2 x daily - 7 x weekly - 1 sets - 5 reps - 20 hold  - Supine Active Ankle Pumps  - 2 x daily - 7 x weekly - 3 sets - 10 reps  - Long Sitting Calf Stretch with Strap  - 2 x daily - 7 x weekly - 1 sets - 5 reps - 20 hold  - Hooklying Hamstring Stretch with Strap  - 2 x daily - 7 x weekly - 1 sets - 5 reps - 20 hold    Manuals         PROM prone 28 38 KD KD 10' 10                     PROM mmt    48deg 50        Patella mobs    5' hep        IASTM    nv 10        (-) pressure IASTM    nv 10                                                                                      Ther Ex             Heel slides  Supine PROM 10\"x 5 Supine 10\" x 10  10 x :10 20 x :10        Ankle Pumps  HEP           Ankle circles  HEP           Hamstring stretch             Calf stretch  Seated 20\" x 5 Supine 20\"  x5           Seated knee flexion hang- PT assist   10\" x 10  5 x :30 5 x :30        Wall slides -PROM    5 x :30 Np/nv                     Ther Activity                                       Gait Training                                       Modalities                                                  "

## 2023-12-24 DIAGNOSIS — M25.521 RIGHT ELBOW PAIN: ICD-10-CM

## 2023-12-24 DIAGNOSIS — M79.601 RIGHT ARM PAIN: ICD-10-CM

## 2023-12-26 ENCOUNTER — APPOINTMENT (OUTPATIENT)
Dept: PHYSICAL THERAPY | Facility: CLINIC | Age: 38
End: 2023-12-26
Payer: COMMERCIAL

## 2023-12-27 RX ORDER — NAPROXEN 375 MG/1
375 TABLET ORAL 2 TIMES DAILY WITH MEALS
Qty: 60 TABLET | Refills: 0 | Status: SHIPPED | OUTPATIENT
Start: 2023-12-27 | End: 2024-01-26

## 2023-12-28 ENCOUNTER — OFFICE VISIT (OUTPATIENT)
Dept: PHYSICAL THERAPY | Facility: CLINIC | Age: 38
End: 2023-12-28
Payer: COMMERCIAL

## 2023-12-28 DIAGNOSIS — S86.811A RUPTURE OF RIGHT PATELLAR TENDON, INITIAL ENCOUNTER: Primary | ICD-10-CM

## 2023-12-28 PROCEDURE — 97140 MANUAL THERAPY 1/> REGIONS: CPT

## 2023-12-28 PROCEDURE — 97110 THERAPEUTIC EXERCISES: CPT

## 2023-12-28 NOTE — PROGRESS NOTES
"Daily Note     Today's date: 2023  Patient name: Lorenzo Hebert  : 1985  MRN: [id number]  Referring provider: Quentin Jimenez PA-C  Dx: No diagnosis found.               Subjective: Patient states that he has compliant with HEP but ROM is limited.       Objective: See treatment diary below      Assessment: Tolerated treatment well. Patient exhibited good technique with therapeutic exercises      Plan: Continue per plan of care.      Precautions:  0 - 40 degrees initially then add 10 degrees each week.      Access Code: [email or web address]  URL: [email or web address]  Date: 2023  Prepared by: Kalpana Nunes    Exercises  - Sitting Heel Slide with Towel  - 2 x daily - 7 x weekly - 1 sets - 5 reps - 20 hold  - Supine Active Ankle Pumps  - 2 x daily - 7 x weekly - 3 sets - 10 reps  - Long Sitting Calf Stretch with Strap  - 2 x daily - 7 x weekly - 1 sets - 5 reps - 20 hold  - Hooklying Hamstring Stretch with Strap  - 2 x daily - 7 x weekly - 1 sets - 5 reps - 20 hold    Manuals        PROM prone 28 38 KD KD 10' 10 10                     PROM mmt    48deg 50 50       Patella mobs    5' hep        IASTM    nv 10        (-) pressure IASTM    nv 10 10                                                                                     Ther Ex             Heel slides  Supine PROM 10\"x 5 Supine 10\" x 10  10 x :10 20 x :10 20       Ankle Pumps  HEP           Ankle circles  HEP           Hamstring stretch             Calf stretch  Seated 20\" x 5 Supine 20\"  x5           Seated knee flexion hang- PT assist   10\" x 10  5 x :30 5 x :30 5 x 30-\"        Wall slides -PROM    5 x :30 Np/nv 10 min MHP                    Ther Activity                                       Gait Training                                       Modalities                                                    "

## 2023-12-29 ENCOUNTER — OFFICE VISIT (OUTPATIENT)
Dept: OBGYN CLINIC | Facility: CLINIC | Age: 38
End: 2023-12-29

## 2023-12-29 VITALS — HEIGHT: 71 IN | BODY MASS INDEX: 28.7 KG/M2 | WEIGHT: 205 LBS

## 2023-12-29 DIAGNOSIS — S86.811A RUPTURE OF RIGHT PATELLAR TENDON, INITIAL ENCOUNTER: Primary | ICD-10-CM

## 2023-12-29 PROCEDURE — 99024 POSTOP FOLLOW-UP VISIT: CPT | Performed by: ORTHOPAEDIC SURGERY

## 2023-12-29 NOTE — PROGRESS NOTES
Assessment:  1. Rupture of right patellar tendon, initial encounter  Ambulatory Referral to Physical Therapy        Patient Active Problem List   Diagnosis   • Rupture of right patellar tendon, initial encounter   • Preoperative clearance           Plan      38-year-old male who presents 5.5 weeks status post patellar tendon repair. At this time, the TROM brace will be unlocked from 0-30°. He may take the oxycodone as needed prior to attending physical therapy to allow a higher tolerance for activity during the sessions. He may wear a compression sleeve for swelling if he chooses. A renewal of physical therapy was placed today. He may increase the frequency of physical therapy to 3 times a week. He may begin to wean out of the TROM brace in a few weeks. He will follow-up in 6 weeks and should no longer be wearing the knee brace at that time.            Subjective:    Patient ID: Lorenzo Hebert 38 y.o. male    Chief Complaint: Right Knee Post-Op #2      HPI    Patient comes in today 5.5 weeks status post patellar tendon repair. He denies experiencing any pain at this time. He presents in a TROM brace locked at 0°. He has started physical therapy, which he feels has been going well. He has been taking naproxen for swelling and tylenol for pain as needed.      The following portions of the patient's history were reviewed and updated as appropriate: allergies, current medications, past family history, past social history, past surgical history and problem list.        Social History     Socioeconomic History   • Marital status: Single     Spouse name: Not on file   • Number of children: Not on file   • Years of education: Not on file   • Highest education level: Not on file   Occupational History   • Not on file   Tobacco Use   • Smoking status: Never   • Smokeless tobacco: Never   Vaping Use   • Vaping status: Never Used   Substance and Sexual Activity   • Alcohol use: Yes     Comment: occassionally   • Drug use:  Never   • Sexual activity: Not on file   Other Topics Concern   • Not on file   Social History Narrative   • Not on file     Social Determinants of Health     Financial Resource Strain: Not on file   Food Insecurity: Not on file   Transportation Needs: Not on file   Physical Activity: Not on file   Stress: Not on file   Social Connections: Not on file   Intimate Partner Violence: Not on file   Housing Stability: Not on file     History reviewed. No pertinent past medical history.  Past Surgical History:   Procedure Laterality Date   • MI ARTHROPLASTY PATELLA W/O PROSTHESIS Right 11/21/2023    Procedure: REPAIR TENDON PATELLA;  Surgeon: Camden Cm DO;  Location: [facility] MAIN OR;  Service: Orthopedics     No Known Allergies  Current Outpatient Medications on File Prior to Visit   Medication Sig Dispense Refill   • Multiple Vitamin (multivitamin) capsule Take 1 capsule by mouth daily     • naproxen (NAPROSYN) 375 mg tablet Take 1 tablet (375 mg total) by mouth 2 (two) times a day with meals As needed for pain 60 tablet 0   • Omega-3 Fatty Acids (FISH OIL PO) Take by mouth     • aspirin (ECOTRIN) 325 mg EC tablet Take 1 tablet (325 mg total) by mouth daily with breakfast Starting after surgery 30 tablet 0   • methocarbamol (ROBAXIN) 750 mg tablet Take 1 tablet (750 mg total) by mouth 4 (four) times a day as needed for muscle spasms 60 tablet 0     No current facility-administered medications on file prior to visit.              Objective:    Review of Systems   Constitutional:  Negative for chills and fever.   HENT:  Negative for ear pain and sore throat.    Eyes:  Negative for pain and visual disturbance.   Respiratory:  Negative for cough and shortness of breath.    Cardiovascular:  Negative for chest pain and palpitations.   Gastrointestinal:  Negative for abdominal pain and vomiting.   Genitourinary:  Negative for dysuria and hematuria.   Musculoskeletal:  Negative for arthralgias and back pain.   Skin:  Negative  "for color change and rash.   Neurological:  Negative for seizures and syncope.   All other systems reviewed and are negative.      Right Knee Exam     Range of Motion   Extension:  0   Flexion:  40 (passively)     Other   Erythema: absent  Scars: present  Sensation: normal  Pulse: present  Swelling: mild  Effusion: no effusion present    Comments:  Anterior surgical scar well-healed            Physical Exam  Vitals and nursing note reviewed.   Constitutional:       Appearance: Normal appearance.   HENT:      Head: Normocephalic and atraumatic.      Right Ear: External ear normal.      Left Ear: External ear normal.      Nose: Nose normal.   Eyes:      General: No scleral icterus.     Extraocular Movements: Extraocular movements intact.      Conjunctiva/sclera: Conjunctivae normal.   Cardiovascular:      Rate and Rhythm: Normal rate.   Pulmonary:      Effort: Pulmonary effort is normal. No respiratory distress.   Musculoskeletal:      Cervical back: Normal range of motion and neck supple.      Right knee: No effusion.      Comments: See ortho exam   Skin:     General: Skin is warm and dry.   Neurological:      Mental Status: He is alert and oriented to person, place, and time.   Psychiatric:         Mood and Affect: Mood normal.         Behavior: Behavior normal.         Procedures  No Procedures performed today      Scribe Attestation    I,:  Naomi Orozco am acting as a scribe while in the presence of the attending physician.:       I,:  Camden Cm, DO personally performed the services described in this documentation    as scribed in my presence.:           Portions of the record may have been created with voice recognition software.  Occasional wrong word or \"sound a like\" substitutions may have occurred due to the inherent limitations of voice recognition software.  Read the chart carefully and recognize, using context, where substitutions have occurred.    "

## 2024-01-02 ENCOUNTER — APPOINTMENT (OUTPATIENT)
Dept: PHYSICAL THERAPY | Facility: CLINIC | Age: 39
End: 2024-01-02
Payer: COMMERCIAL

## 2024-01-04 ENCOUNTER — OFFICE VISIT (OUTPATIENT)
Dept: PHYSICAL THERAPY | Facility: CLINIC | Age: 39
End: 2024-01-04
Payer: COMMERCIAL

## 2024-01-04 DIAGNOSIS — S86.811A RUPTURE OF RIGHT PATELLAR TENDON, INITIAL ENCOUNTER: Primary | ICD-10-CM

## 2024-01-04 PROCEDURE — 97110 THERAPEUTIC EXERCISES: CPT

## 2024-01-04 PROCEDURE — 97140 MANUAL THERAPY 1/> REGIONS: CPT

## 2024-01-04 NOTE — PROGRESS NOTES
"Daily Note     Today's date: 2024  Patient name: Lorenzo Hebert  : 1985  MRN: [id number]  Referring provider: Quentin Jimenez PA-C  Dx: No diagnosis found.               Subjective: Patient saw MD and was able to unlock brace to 30 degrees.       Objective: See treatment diary below      Assessment: Tolerated treatment well. Patient exhibited good technique with therapeutic exercises      Plan: Continue per plan of care.      Precautions:  0 - 40 degrees initially then add 10 degrees each week.      Access Code: [email or web address]  URL: [email or web address]  Date: 2023  Prepared by: Kalpana Nunes    Exercises  - Sitting Heel Slide with Towel  - 2 x daily - 7 x weekly - 1 sets - 5 reps - 20 hold  - Supine Active Ankle Pumps  - 2 x daily - 7 x weekly - 3 sets - 10 reps  - Long Sitting Calf Stretch with Strap  - 2 x daily - 7 x weekly - 1 sets - 5 reps - 20 hold  - Hooklying Hamstring Stretch with Strap  - 2 x daily - 7 x weekly - 1 sets - 5 reps - 20 hold    Manuals       PROM prone 28 38 KD KD 10' 10 10  15                   PROM mmt    48deg 50 50       Patella mobs    5' hep        IASTM    nv 10        (-) pressure IASTM    nv 10 10                                                                                     Ther Ex             Heel slides  Supine PROM 10\"x 5 Supine 10\" x 10  10 x :10 20 x :10 20 10\" x 10       Bike for ROM        10/10                                 Calf stretch  Seated 20\" x 5 Supine 20\"  x5           Seated knee flexion hang- PT assist   10\" x 10  5 x :30 5 x :30 5 x 30-\"  5 min hand no assist      Wall slides -PROM    5 x :30 Np/nv 10 min MHP 10 min MHP       Quad sets       5\" x 20       SLR with quad set        1 x 10       Active knee flexion prone       5\" x 20                    Ther Activity                                       Gait Training                                       Modalities                        "

## 2024-01-08 ENCOUNTER — APPOINTMENT (OUTPATIENT)
Dept: PHYSICAL THERAPY | Facility: CLINIC | Age: 39
End: 2024-01-08
Payer: COMMERCIAL

## 2024-01-08 ENCOUNTER — PATIENT MESSAGE (OUTPATIENT)
Dept: OBGYN CLINIC | Facility: CLINIC | Age: 39
End: 2024-01-08

## 2024-01-08 DIAGNOSIS — Z98.890 STATUS POST TENDON REPAIR: Primary | ICD-10-CM

## 2024-01-09 ENCOUNTER — OFFICE VISIT (OUTPATIENT)
Dept: PHYSICAL THERAPY | Facility: CLINIC | Age: 39
End: 2024-01-09
Payer: COMMERCIAL

## 2024-01-09 ENCOUNTER — TELEPHONE (OUTPATIENT)
Dept: OBGYN CLINIC | Facility: CLINIC | Age: 39
End: 2024-01-09

## 2024-01-09 DIAGNOSIS — S86.811A RUPTURE OF RIGHT PATELLAR TENDON, INITIAL ENCOUNTER: Primary | ICD-10-CM

## 2024-01-09 PROCEDURE — 97110 THERAPEUTIC EXERCISES: CPT | Performed by: PHYSICAL THERAPIST

## 2024-01-09 PROCEDURE — 97140 MANUAL THERAPY 1/> REGIONS: CPT | Performed by: PHYSICAL THERAPIST

## 2024-01-09 RX ORDER — CEPHALEXIN 500 MG/1
500 CAPSULE ORAL EVERY 6 HOURS SCHEDULED
Qty: 28 CAPSULE | Refills: 0 | Status: SHIPPED | OUTPATIENT
Start: 2024-01-09 | End: 2024-01-16

## 2024-01-09 NOTE — TELEPHONE ENCOUNTER
Called pt and lvm to have him come in to get knee looked at. Opening tomorrow 1/10/2024 at 1015am please confirm with patient time and place.

## 2024-01-09 NOTE — PROGRESS NOTES
"Daily Note     Today's date: 2024  Patient name: Lorenzo Hebert  : 1985  MRN: [id number]  Referring provider: Quentin Jimenez PA-C  Dx:   Encounter Diagnosis     ICD-10-CM    1. Rupture of right patellar tendon, initial encounter  S86.811A           Start Time: 0900  Stop Time: 1100  Total time in clinic (min): 120 minutes    Subjective: Patient presents with concerns of an infection in the wound      Objective: See treatment diary below  Assessed wound and communicated with MD re: possible infection      Assessment: Tolerated treatment well. Patient would benefit from continued PT Patient with some weeping of his wound area.  The incision looks well healed.  There is a purulent smell, however, there is no purulent drainage.  He does not have increased temp of the area nor is there a significant change in pain levels.        Plan: Continue per plan of care.      Precautions:  0 - 40 degrees initially then add 10 degrees each week.      Access Code: [email or web address]  URL: [email or web address]  Date: 2023  Prepared by: Kalpana Nunes    Exercises  - Sitting Heel Slide with Towel  - 2 x daily - 7 x weekly - 1 sets - 5 reps - 20 hold  - Supine Active Ankle Pumps  - 2 x daily - 7 x weekly - 3 sets - 10 reps  - Long Sitting Calf Stretch with Strap  - 2 x daily - 7 x weekly - 1 sets - 5 reps - 20 hold  - Hooklying Hamstring Stretch with Strap  - 2 x daily - 7 x weekly - 1 sets - 5 reps - 20 hold    Manuals      PROM prone 28 38 KD KD 10' 10 10  15 10                  PROM mmt    48deg 50 50  56     Patella mobs    5' hep        IASTM    nv 10        (-) pressure IASTM    nv 10 10       MMT/infection assessment        10'                                                                      Ther Ex             Heel slides  Supine PROM 10\"x 5 Supine 10\" x 10  10 x :10 20 x :10 20 10\" x 10  10 x :10     Bike for ROM        10/10  10/10                " "  Standing knee flexion        10     Calf stretch  Seated 20\" x 5 Supine 20\"  x5           Seated knee flexion hang- PT assist   10\" x 10  5 x :30 5 x :30 5 x 30-\"  5 min hand no assist 5' with TB     Wall slides -PROM    5 x :30 Np/nv 10 min MHP 10 min MHP  10'     Quad sets       5\" x 20  5 x :20     SLR with quad set        1 x 10  10     Active knee flexion prone       5\" x 20  20 x :05     Sit to stnd        10'     Ther Activity                                       Gait Training                                       Modalities                                                        "

## 2024-01-10 ENCOUNTER — HOSPITAL ENCOUNTER (OUTPATIENT)
Dept: ULTRASOUND IMAGING | Facility: HOSPITAL | Age: 39
Discharge: HOME | End: 2024-01-10
Payer: COMMERCIAL

## 2024-01-10 ENCOUNTER — OFFICE VISIT (OUTPATIENT)
Dept: OBGYN CLINIC | Facility: CLINIC | Age: 39
End: 2024-01-10

## 2024-01-10 VITALS — WEIGHT: 205 LBS | HEIGHT: 71 IN | BODY MASS INDEX: 28.7 KG/M2

## 2024-01-10 DIAGNOSIS — M25.461 SWELLING OF RIGHT KNEE JOINT: ICD-10-CM

## 2024-01-10 DIAGNOSIS — Z98.890 STATUS POST TENDON REPAIR: ICD-10-CM

## 2024-01-10 DIAGNOSIS — M25.461 SWELLING OF RIGHT KNEE JOINT: Primary | ICD-10-CM

## 2024-01-10 DIAGNOSIS — M24.661 ARTHROFIBROSIS OF KNEE JOINT, RIGHT: ICD-10-CM

## 2024-01-10 PROCEDURE — 99024 POSTOP FOLLOW-UP VISIT: CPT | Performed by: ORTHOPAEDIC SURGERY

## 2024-01-10 PROCEDURE — 76882 US LMTD JT/FCL EVL NVASC XTR: CPT

## 2024-01-10 NOTE — PROGRESS NOTES
Assessment:   Status Post  Repair Tendon Patella - Right on 11/21/2023 with clinical evidence of folliculitis swelling of the infrapatellar region and weeping edema    Plan:   Weight bearing: As tolerated with T ROM brace unlocked to 30 degrees progress as tolerated physical therapy  Pain control as needed  Keflex been prescribed  PT -continue  Will order an ultrasound of the right infrapatellar region and prepatellar region at this time cannot appreciate a definite subcutaneous fluid collection in the prepatellar infrapatellar bursa, given the folliculitis is present would like to confirm a fluid collection as aspiration risks seeding deeper tissues with infection given the surrounding folliculitis  Will also order a flexion suzan device for him    Follow Up:  4  week(s), we will have him follow-up sooner if fluid collection is identified in the infrapatellar region for aspiration        CHIEF COMPLAINT:  Chief Complaint   Patient presents with    Right Knee - Pain, Post-op         SUBJECTIVE:  Lorenzo Hebert is a 38 y.o. year old male who presents for follow up after Repair Tendon Patella - Right. Today patient has stiffness of the right knee.  He noted yesterday after removing piece of glue some yellow fluid coming from the middle incision.  The incision itself is well-healed.  He denies any fevers or chills.  Denies any severe pain although does have some pain in the infrapatellar region.  Denies any thick purulent drainage from this area there is a persistent weeping of the thin yellow fluid.      PHYSICAL EXAMINATION:    MUSCULOSKELETAL EXAMINATION: Right knee  General: Alert and oriented x 3, WNWD, NAD  Incision: Well-healed, there is evidence of folliculitis in the infrapatellar region and swelling in this area as well.  There is some weeping edema as well.  See clinical image below  No andrew-incisional erythema  There is no joint effusion.  There is no definite infrapatellar bursal fluid collection  possibly some slight fluctuance that cannot be easily differentiated from subcutaneous edema on palpation due to some firmness there  Range of Motion: 0 to 30 degrees, straight leg raise intact  Neurovascular status: Sensation intact to light touch L1-S1   Motor intact L1-S1  Pulses: Intact            STUDIES REVIEWED:  I have personally reviewed pertinent films in PACS.      PROCEDURES PERFORMED:  Procedures  No Procedures performed today

## 2024-01-11 ENCOUNTER — OFFICE VISIT (OUTPATIENT)
Dept: PHYSICAL THERAPY | Facility: CLINIC | Age: 39
End: 2024-01-11
Payer: COMMERCIAL

## 2024-01-11 DIAGNOSIS — S86.811A RUPTURE OF RIGHT PATELLAR TENDON, INITIAL ENCOUNTER: Primary | ICD-10-CM

## 2024-01-11 PROCEDURE — 97110 THERAPEUTIC EXERCISES: CPT | Performed by: PHYSICAL THERAPIST

## 2024-01-11 PROCEDURE — 97140 MANUAL THERAPY 1/> REGIONS: CPT | Performed by: PHYSICAL THERAPIST

## 2024-01-11 NOTE — PROGRESS NOTES
"Daily Note     Today's date: 2024  Patient name: Lorenzo Hebert  : 1985  MRN: [id number]  Referring provider: Quentin Jimenez PA-C  Dx:   Encounter Diagnosis     ICD-10-CM    1. Rupture of right patellar tendon, initial encounter  S86.811A                      Subjective: Patient saw MD who felt that there was no infection and US looked good.        Objective: See treatment diary below      Assessment: Tolerated treatment well. Patient would benefit from continued PT      Plan: Continue per plan of care.      Precautions:  0 - 40 degrees initially then add 10 degrees each week.      Access Code: [email or web address]  URL: [email or web address]  Date: 2023  Prepared by: Kalpana Nunes    Exercises  - Sitting Heel Slide with Towel  - 2 x daily - 7 x weekly - 1 sets - 5 reps - 20 hold  - Supine Active Ankle Pumps  - 2 x daily - 7 x weekly - 3 sets - 10 reps  - Long Sitting Calf Stretch with Strap  - 2 x daily - 7 x weekly - 1 sets - 5 reps - 20 hold  - Hooklying Hamstring Stretch with Strap  - 2 x daily - 7 x weekly - 1 sets - 5 reps - 20 hold    Manuals     PROM prone 28 38 KD KD 10' 10 10  15 10 10 seated                 PROM mmt    48deg 50 50  56 53    Patella mobs    5' hep        IASTM    nv 10        (-) pressure IASTM    nv 10 10       MMT/infection assessment        10'                                                                      Ther Ex             Heel slides  Supine PROM 10\"x 5 Supine 10\" x 10  10 x :10 20 x :10 20 10\" x 10  10 x :10     Bike for ROM        10/10  10/10 10/                 Standing knee flexion        10     Calf stretch  Seated 20\" x 5 Supine 20\"  x5           Seated knee flexion hang- PT assist   10\" x 10  5 x :30 5 x :30 5 x 30-\"  5 min hand no assist 5' with TB 5'    Wall slides -PROM    5 x :30 Np/nv 10 min MHP 10 min MHP  10' 10'    Quad sets       5\" x 20  5 x :20 5 x :20    SLR with quad set        1 x " "10  10 20    Active knee flexion prone       5\" x 20  20 x :05 20     Sit to stnd        10 10    S/l ABD         20    TG          10 x :10                 Gait Training                                       Modalities                                                          "

## 2024-01-12 ENCOUNTER — TELEPHONE (OUTPATIENT)
Dept: OTHER | Facility: HOSPITAL | Age: 39
End: 2024-01-12

## 2024-01-12 NOTE — TELEPHONE ENCOUNTER
I called and spoke to the patient over the phone on the date of the study once the results are finalized.  Discussed with him that there is no fluid collection no aspiration or intervention indicated at this time.  He will continue taking Keflex and monitoring his swelling and edema.  Advised daily dressing changes with a compressive dressing elevation and ice.  He will contact us and send us a picture via Post-it in 1 week of the knee and we will reevaluate and assess at that point.

## 2024-01-15 ENCOUNTER — OFFICE VISIT (OUTPATIENT)
Dept: PHYSICAL THERAPY | Facility: CLINIC | Age: 39
End: 2024-01-15
Payer: COMMERCIAL

## 2024-01-15 DIAGNOSIS — S86.811A RUPTURE OF RIGHT PATELLAR TENDON, INITIAL ENCOUNTER: Primary | ICD-10-CM

## 2024-01-15 PROCEDURE — 97110 THERAPEUTIC EXERCISES: CPT | Performed by: PHYSICAL THERAPIST

## 2024-01-15 PROCEDURE — 97112 NEUROMUSCULAR REEDUCATION: CPT | Performed by: PHYSICAL THERAPIST

## 2024-01-15 PROCEDURE — 97140 MANUAL THERAPY 1/> REGIONS: CPT | Performed by: PHYSICAL THERAPIST

## 2024-01-15 NOTE — PROGRESS NOTES
"Daily Note     Today's date: 1/15/2024  Patient name: Lorenzo Hebert  : 1985  MRN: [id number]  Referring provider: Quentin Jimenez PA-C  Dx:   Encounter Diagnosis     ICD-10-CM    1. Rupture of right patellar tendon, initial encounter  S86.811A                      Subjective: No new complaints.      Objective: See treatment diary below      Assessment: Focus remains on flexion.  Needing cueing to maintain full knee extension during SLR      Plan: Continue per plan of care.      Precautions:  0 - 40 degrees initially then add 10 degrees each week.      Access Code: [email or web address]  URL: [email or web address]  Date: 2023  Prepared by: Kalpana Nunes    Exercises  - Sitting Heel Slide with Towel  - 2 x daily - 7 x weekly - 1 sets - 5 reps - 20 hold  - Supine Active Ankle Pumps  - 2 x daily - 7 x weekly - 3 sets - 10 reps  - Long Sitting Calf Stretch with Strap  - 2 x daily - 7 x weekly - 1 sets - 5 reps - 20 hold  - Hooklying Hamstring Stretch with Strap  - 2 x daily - 7 x weekly - 1 sets - 5 reps - 20 hold    Manuals 12/6 12/12 12/14 12/19 12/21 12/28 1/4 1/9 1/11 1/15   PROM prone 28 38 KD KD 10' 10 10  15 10 10 seated CHRISTOPHER 10                PROM mmt    48deg 50 50  56 53 55   Patella mobs    5' hep        IASTM    nv 10        (-) pressure IASTM    nv 10 10       MMT/infection assessment        10'                                                                      Ther Ex             Heel slides  Supine PROM 10\"x 5 Supine 10\" x 10  10 x :10 20 x :10 20 10\" x 10  10 x :10  10x10   Bike for ROM        10/10  10/10 10/ 10                Standing knee flexion        10     Calf stretch  Seated 20\" x 5 Supine 20\"  x5           Seated knee flexion hang- PT assist   10\" x 10  5 x :30 5 x :30 5 x 30-\"  5 min hand no assist 5' with TB 5' 5' with mob belt   Wall slides -PROM    5 x :30 Np/nv 10 min MHP 10 min MHP  10' 10' 10' with MH   Quad sets       5\" x 20  5 x :20 5 x :20 5sx20   SLR with quad " "set        1 x 10  10 20 2x15   Active knee flexion prone       5\" x 20  20 x :05 20  2x15   Sit to stnd        10 10 np   S/l ABD         20    TG          10 x :10                 Gait Training                                       Modalities                                                            "

## 2024-01-16 ENCOUNTER — OFFICE VISIT (OUTPATIENT)
Dept: PHYSICAL THERAPY | Facility: CLINIC | Age: 39
End: 2024-01-16
Payer: COMMERCIAL

## 2024-01-16 DIAGNOSIS — S86.811A RUPTURE OF RIGHT PATELLAR TENDON, INITIAL ENCOUNTER: Primary | ICD-10-CM

## 2024-01-16 PROCEDURE — 97140 MANUAL THERAPY 1/> REGIONS: CPT | Performed by: PHYSICAL THERAPIST

## 2024-01-16 PROCEDURE — 97110 THERAPEUTIC EXERCISES: CPT | Performed by: PHYSICAL THERAPIST

## 2024-01-16 PROCEDURE — 97112 NEUROMUSCULAR REEDUCATION: CPT | Performed by: PHYSICAL THERAPIST

## 2024-01-16 NOTE — PROGRESS NOTES
Daily Note     Today's date: 2024  Patient name: Lorenzo Hebert  : 1985  MRN: [id number]  Referring provider: Quentin Jimenez PA-C  Dx:   Encounter Diagnosis     ICD-10-CM    1. Rupture of right patellar tendon, initial encounter  S86.811A           Start Time: 0930  Stop Time: 1100  Total time in clinic (min): 90 minutes    Subjective: Patient reports that he feels he may be allergic to keflex.  He feels that his knee is improved overall.  Also reports that he took a muscle relaxant LV and felt his knee moved better.  He did not take it today.      Objective: See treatment diary below      Assessment: Tolerated treatment well. Patient would benefit from continued PT      Plan: Continue per plan of care.      Precautions:  0 - 40 degrees initially then add 10 degrees each week.      Access Code: [email or web address]  URL: [email or web address]  Date: 2023  Prepared by: Kalpana Nunes    Exercises  - Sitting Heel Slide with Towel  - 2 x daily - 7 x weekly - 1 sets - 5 reps - 20 hold  - Supine Active Ankle Pumps  - 2 x daily - 7 x weekly - 3 sets - 10 reps  - Long Sitting Calf Stretch with Strap  - 2 x daily - 7 x weekly - 1 sets - 5 reps - 20 hold  - Hooklying Hamstring Stretch with Strap  - 2 x daily - 7 x weekly - 1 sets - 5 reps - 20 hold    Manuals 1/16         1/15   PROM prone 10         CHRISTOPHER 10   FOTO nv            PROM mmt 55         55   Patella mobs             IASTM             (-) pressure IASTM             MMT/infection assessment                                                                              Ther Ex             Heel slides 10 x :10         10x10   Bike for ROM  10         10                Standing knee flexion             Calf stretch             Seated knee flexion hang- PT assist 10' pt push with opp le         5' with mob belt   Wall slides -PROM 10' w/MH and R LE push         10' with MH   Quad sets          5sx20   SLR with quad set  2 x 15         2x15    Active knee flexion prone          2x15   Sit to stnd          np   S/l ABD 20            TG  10 x :30                          Gait Training                                       Modalities

## 2024-01-18 ENCOUNTER — OFFICE VISIT (OUTPATIENT)
Dept: PHYSICAL THERAPY | Facility: CLINIC | Age: 39
End: 2024-01-18
Payer: COMMERCIAL

## 2024-01-18 DIAGNOSIS — S86.811A RUPTURE OF RIGHT PATELLAR TENDON, INITIAL ENCOUNTER: Primary | ICD-10-CM

## 2024-01-18 PROCEDURE — 97140 MANUAL THERAPY 1/> REGIONS: CPT

## 2024-01-18 PROCEDURE — 97110 THERAPEUTIC EXERCISES: CPT

## 2024-01-18 NOTE — PROGRESS NOTES
"Daily Note     Today's date: 2024  Patient name: Lorenzo Hebert  : 1985  MRN: [id number]  Referring provider: Quentin Jimenez PA-C  Dx: No diagnosis found.               Subjective: Patient to follow up with MD tomorrow.       Objective: See treatment diary below      Assessment: Tolerated treatment well.       Plan: Continue per plan of care.      Precautions:  0 - 40 degrees initially then add 10 degrees each week.      Access Code: [email or web address]  URL: [email or web address]  Date: 2023  Prepared by: Kalpana Nunes    Exercises  - Sitting Heel Slide with Towel  - 2 x daily - 7 x weekly - 1 sets - 5 reps - 20 hold  - Supine Active Ankle Pumps  - 2 x daily - 7 x weekly - 3 sets - 10 reps  - Long Sitting Calf Stretch with Strap  - 2 x daily - 7 x weekly - 1 sets - 5 reps - 20 hold  - Hooklying Hamstring Stretch with Strap  - 2 x daily - 7 x weekly - 1 sets - 5 reps - 20 hold    Manuals 1/16 1/18        1/15   PROM prone 10 10         CHRISTOPHER 10   FOTO nv            PROM mmt 55 58        55   Patella mobs             IASTM             (-) pressure IASTM             MMT/infection assessment                                                                              Ther Ex             Heel slides 10 x :10 10\" x 10        10x10   Bike for ROM  10 10         10                Standing knee flexion             Calf stretch             Seated knee flexion hang- PT assist 10' pt push with opp le 10\" x 10 (opp le assist)        5' with mob belt   Wall slides -PROM 10' w/MH and R LE push 10 with MHP         10' with MH   Quad sets          5sx20   SLR with quad set  2 x 15 2 x 15         2x15   Active knee flexion prone          2x15   Sit to stnd          np   S/l ABD 20            TG  10 x :30  30\" x 10            Leg press sustained stretch  85# 5 min x 2            Gait Training                                       Modalities                                                                "

## 2024-01-22 ENCOUNTER — OFFICE VISIT (OUTPATIENT)
Dept: PHYSICAL THERAPY | Facility: CLINIC | Age: 39
End: 2024-01-22
Payer: COMMERCIAL

## 2024-01-22 DIAGNOSIS — S86.811A RUPTURE OF RIGHT PATELLAR TENDON, INITIAL ENCOUNTER: Primary | ICD-10-CM

## 2024-01-22 PROCEDURE — 97112 NEUROMUSCULAR REEDUCATION: CPT | Performed by: PHYSICAL THERAPIST

## 2024-01-22 PROCEDURE — 97110 THERAPEUTIC EXERCISES: CPT | Performed by: PHYSICAL THERAPIST

## 2024-01-22 NOTE — PROGRESS NOTES
"Daily Note     Today's date: 2024  Patient name: Lorenzo Hebert  : 1985  MRN: [id number]  Referring provider: Quentin Jimenez PA-C  Dx:   Encounter Diagnosis     ICD-10-CM    1. Rupture of right patellar tendon, initial encounter  S86.811A                      Subjective: Patient reports that he tried to speak with the flexionator people and they have not returned his call.        Objective: See treatment diary below      Assessment: Tolerated treatment well. Patient would benefit from continued PT      Plan: Continue per plan of care.      Precautions:  0 - 40 degrees initially then add 10 degrees each week.      Access Code: [email or web address]  URL: [email or web address]  Date: 2023  Prepared by: Kalpana Nunes    Exercises  - Sitting Heel Slide with Towel  - 2 x daily - 7 x weekly - 1 sets - 5 reps - 20 hold  - Supine Active Ankle Pumps  - 2 x daily - 7 x weekly - 3 sets - 10 reps  - Long Sitting Calf Stretch with Strap  - 2 x daily - 7 x weekly - 1 sets - 5 reps - 20 hold  - Hooklying Hamstring Stretch with Strap  - 2 x daily - 7 x weekly - 1 sets - 5 reps - 20 hold    Manuals 1/16 1/18 1/22       1/15   PROM prone 10 10  CHRISTOPHER       CHRISTOPHER 10   FOTO nv            PROM mmt 55 58        55   Patella mobs             IASTM             (-) pressure IASTM             MMT/infection assessment                                                                              Ther Ex             Heel slides 10 x :10 10\" x 10 10 x :10       10x10   Bike for ROM  10 10  10       10                Standing knee flexion   10          Calf stretch             Seated knee flexion hang- PT assist 10' pt push with opp le 10\" x 10 (opp le assist) 10 x :10       5' with mob belt   Wall slides -PROM 10' w/MH and R LE push 10 with MHP  10' MH       10' with MH   Quad sets          5sx20   SLR with quad set  2 x 15 2 x 15  2 x 15       2x15   Active knee flexion prone          2x15   Sit to stnd          np   S/l ABD " "20            TG  10 x :30  30\" x 10  nv          Leg press sustained stretch  85# 5 min x 2  nv          Gait Training                                       Modalities                                                                  "

## 2024-01-23 ENCOUNTER — OFFICE VISIT (OUTPATIENT)
Dept: PHYSICAL THERAPY | Facility: CLINIC | Age: 39
End: 2024-01-23
Payer: COMMERCIAL

## 2024-01-23 DIAGNOSIS — S86.811A RUPTURE OF RIGHT PATELLAR TENDON, INITIAL ENCOUNTER: Primary | ICD-10-CM

## 2024-01-23 PROCEDURE — 97110 THERAPEUTIC EXERCISES: CPT | Performed by: PHYSICAL THERAPIST

## 2024-01-23 PROCEDURE — 97112 NEUROMUSCULAR REEDUCATION: CPT | Performed by: PHYSICAL THERAPIST

## 2024-01-23 PROCEDURE — 97140 MANUAL THERAPY 1/> REGIONS: CPT | Performed by: PHYSICAL THERAPIST

## 2024-01-23 NOTE — PROGRESS NOTES
"Daily Note     Today's date: 2024  Patient name: Lorenzo Hebert  : 1985  MRN: [id number]  Referring provider: Quentin Jimenez PA-C  Dx: No diagnosis found.               Subjective: Patient reports no significant change today.        Objective: See treatment diary below      Assessment: Tolerated treatment well. Patient would benefit from continued PT      Plan: Continue per plan of care.      Precautions:  0 - 40 degrees initially then add 10 degrees each week.      Access Code: [email or web address]  URL: [email or web address]  Date: 2023  Prepared by: Kalpana Nunes    Exercises  - Sitting Heel Slide with Towel  - 2 x daily - 7 x weekly - 1 sets - 5 reps - 20 hold  - Supine Active Ankle Pumps  - 2 x daily - 7 x weekly - 3 sets - 10 reps  - Long Sitting Calf Stretch with Strap  - 2 x daily - 7 x weekly - 1 sets - 5 reps - 20 hold  - Hooklying Hamstring Stretch with Strap  - 2 x daily - 7 x weekly - 1 sets - 5 reps - 20 hold    Manuals 1/16 1/18 1/22 1/23      1/15   PROM prone 10 10  CHRISTOPHER 15      CHRISTOPHER 10   FOTO nv            PROM mmt 55 58  59      55   Patella mobs             IASTM             (-) pressure IASTM             MMT/infection assessment                                       SLS    10 x :10                                   Ther Ex             Heel slides 10 x :10 10\" x 10 10 x :10 10 x :10         Bike for ROM  10 10  10 10'                      Standing knee flexion   10 10         Calf stretch             Seated knee flexion hang- PT assist 10' pt push with opp le 10\" x 10 (opp le assist) 10 x :10 10'         Wall slides -PROM 10' w/MH and R LE push 10 with MHP  10' MH          Quad sets             SLR with quad set  2 x 15 2 x 15  2 x 15 2 x 15         Active knee flexion prone             Sit to stnd             S/l ABD 20   2 x 20         TG  10 x :30  30\" x 10  nv          Leg press sustained stretch  85# 5 min x 2  nv          Gait Training             Eccentric TM   5' 1 " mph/5% 5' 1 mph 5%         Standing hip 3 way    2# x 20         Modalities

## 2024-01-24 ENCOUNTER — OFFICE VISIT (OUTPATIENT)
Dept: PHYSICAL THERAPY | Facility: CLINIC | Age: 39
End: 2024-01-24
Payer: COMMERCIAL

## 2024-01-24 DIAGNOSIS — S86.811A RUPTURE OF RIGHT PATELLAR TENDON, INITIAL ENCOUNTER: Primary | ICD-10-CM

## 2024-01-24 PROCEDURE — 97110 THERAPEUTIC EXERCISES: CPT

## 2024-01-24 NOTE — PROGRESS NOTES
"Daily Note     Today's date: 2024  Patient name: Lorenzo Hebert  : 1985  MRN: [id number]  Referring provider: Quentin Jimenez PA-C  Dx:   Encounter Diagnosis     ICD-10-CM    1. Rupture of right patellar tendon, initial encounter  S86.811A                      Subjective: No change in status since last treatment session.       Objective: See treatment diary below      Assessment: Tolerated treatment well. Patient exhibited good technique with therapeutic exercises      Plan: Continue per plan of care.      Precautions:  0 - 40 degrees initially then add 10 degrees each week.      Access Code: [email or web address]  URL: [email or web address]  Date: 2023  Prepared by: Kalpana Nunes    Exercises  - Sitting Heel Slide with Towel  - 2 x daily - 7 x weekly - 1 sets - 5 reps - 20 hold  - Supine Active Ankle Pumps  - 2 x daily - 7 x weekly - 3 sets - 10 reps  - Long Sitting Calf Stretch with Strap  - 2 x daily - 7 x weekly - 1 sets - 5 reps - 20 hold  - Hooklying Hamstring Stretch with Strap  - 2 x daily - 7 x weekly - 1 sets - 5 reps - 20 hold    Manuals 1/16 1/18 1/22 1/23 1/24     1/15   PROM prone 10 10  CHRISTOPHER 15      CHRISTOPHER 10   FOTO nv            PROM mmt 55 58  59      55   Patella mobs             IASTM             (-) pressure IASTM             MMT/infection assessment                                       SLS    10 x :10                                   Ther Ex             Heel slides 10 x :10 10\" x 10 10 x :10 10 x :10 10\" x 10         Bike for ROM  10 10  10 10' 10                      Standing knee flexion   10 10         Calf stretch             Seated knee flexion hang- PT assist 10' pt push with opp le 10\" x 10 (opp le assist) 10 x :10 10' 10\" x 10         Wall slides -PROM 10' w/MH and R LE push 10 with MHP  10' MH  10 min MHP         Quad sets             SLR with quad set  2 x 15 2 x 15  2 x 15 2 x 15 2 x 10         Active knee flexion prone             Sit to stnd             S/l ABD " "20   2 x 20 2 x 10         TG  10 x :30  30\" x 10  nv          Leg press sustained stretch  85# 5 min x 2  nv          Seated knee flexion (chair and strap)      10 min         Gait Training             Eccentric TM   5' 1 mph/5% 5' 1 mph 5% 5 min 1.5mph 5%        Standing hip 3 way    2# x 20         Modalities                                                                      "

## 2024-01-25 ENCOUNTER — OFFICE VISIT (OUTPATIENT)
Dept: PHYSICAL THERAPY | Facility: CLINIC | Age: 39
End: 2024-01-25
Payer: COMMERCIAL

## 2024-01-25 DIAGNOSIS — S86.811A RUPTURE OF RIGHT PATELLAR TENDON, INITIAL ENCOUNTER: Primary | ICD-10-CM

## 2024-01-25 PROCEDURE — 97140 MANUAL THERAPY 1/> REGIONS: CPT | Performed by: PHYSICAL THERAPIST

## 2024-01-25 PROCEDURE — 97110 THERAPEUTIC EXERCISES: CPT | Performed by: PHYSICAL THERAPIST

## 2024-01-25 NOTE — PROGRESS NOTES
"Daily Note     Today's date: 2024  Patient name: Lorenzo Hebert  : 1985  MRN: [id number]  Referring provider: Quentin Jimenez PA-C  Dx:   Encounter Diagnosis     ICD-10-CM    1. Rupture of right patellar tendon, initial encounter  S86.811A                      Subjective:  Patient reports no significant change in flexibility      Objective: See treatment diary below      Assessment: Tolerated treatment well. Patient would benefit from continued PT  Patient with a decrease in ROM today.  Patient feels his knee is more swollen.  Encouraged patient to decrease # of visits he is being seen per week as he increased himself from 3-4 days per week.  This has had an adverse effect on the knee with increased effusion.        Plan: Continue per plan of care.   Continue 3x a week and await MD visit for greater clarification and moving forward re: lack of progress with ROM.       Precautions:  0 - 40 degrees initially then add 10 degrees each week.      Access Code: [email or web address]  URL: [email or web address]  Date: 2023  Prepared by: Kalpana Nunes    Exercises  - Sitting Heel Slide with Towel  - 2 x daily - 7 x weekly - 1 sets - 5 reps - 20 hold  - Supine Active Ankle Pumps  - 2 x daily - 7 x weekly - 3 sets - 10 reps  - Long Sitting Calf Stretch with Strap  - 2 x daily - 7 x weekly - 1 sets - 5 reps - 20 hold  - Hooklying Hamstring Stretch with Strap  - 2 x daily - 7 x weekly - 1 sets - 5 reps - 20 hold    Manuals        PROM prone 10 10  CHRISTOPHER 15  For mmts       FOTO nv            PROM mmt 55 58  59         Patella mobs             IASTM             (-) pressure IASTM             MMT/infection assessment                          Mini squats             SLS    10 x :10  10 x :10                                 Ther Ex             Heel slides 10 x :10 10\" x 10 10 x :10 10 x :10 10\" x 10  10 x :10       Bike for ROM  10 10  10 10' 10  10'       Seated static knee flexion " "st      10'       Standing knee flexion - at wall on step   10 10  10 x :30 hold                    Seated knee flexion hang- PT assist 10' pt push with opp le 10\" x 10 (opp le assist) 10 x :10 10' 10\" x 10  10 x :10       Wall slides -PROM 10' w/MH and R LE push 10 with MHP  10' MH  10 min MHP  10'       Quad sets      hep       SLR with quad set  2 x 15 2 x 15  2 x 15 2 x 15 2 x 10  20       Active knee flexion prone             Sit to stnd             S/l ABD 20   2 x 20 2 x 10  20       TG  10 x :30  30\" x 10  nv  10 x :30 20       Leg press sustained stretch  85# 5 min x 2  nv          Seated knee flexion (chair and strap)      10 min  10'       Gait Training             Eccentric TM   5' 1 mph/5% 5' 1 mph 5% 5 min 1.5mph 5% 10'        Standing hip 3 way    2# x 20 2# x 20 2# x 20       Modalities                                                                        "

## 2024-01-29 ENCOUNTER — OFFICE VISIT (OUTPATIENT)
Dept: PHYSICAL THERAPY | Facility: CLINIC | Age: 39
End: 2024-01-29
Payer: COMMERCIAL

## 2024-01-29 DIAGNOSIS — S86.811A RUPTURE OF RIGHT PATELLAR TENDON, INITIAL ENCOUNTER: Primary | ICD-10-CM

## 2024-01-29 PROCEDURE — 97110 THERAPEUTIC EXERCISES: CPT | Performed by: PHYSICAL THERAPIST

## 2024-01-29 PROCEDURE — 97112 NEUROMUSCULAR REEDUCATION: CPT | Performed by: PHYSICAL THERAPIST

## 2024-01-29 NOTE — PROGRESS NOTES
"Daily Note     Today's date: 2024  Patient name: Lorenzo Hebert  : 1985  MRN: [id number]  Referring provider: Quentin Jimenez PA-C  Dx:   Encounter Diagnosis     ICD-10-CM    1. Rupture of right patellar tendon, initial encounter  S86.811A                      Subjective: Patient reports no significant changes in his knee today.     He notes less swelling in the knee after last week.        Objective: See treatment diary below      Assessment: Tolerated treatment well. Patient would benefit from continued PT  Patient able to stretch to 60 degrees      Plan: Continue per plan of care.      Precautions:  0 - 40 degrees initially then add 10 degrees each week.      Access Code: [email or web address]  URL: [email or web address]  Date: 2023  Prepared by: Kalpana Nunes    Exercises  - Sitting Heel Slide with Towel  - 2 x daily - 7 x weekly - 1 sets - 5 reps - 20 hold  - Supine Active Ankle Pumps  - 2 x daily - 7 x weekly - 3 sets - 10 reps  - Long Sitting Calf Stretch with Strap  - 2 x daily - 7 x weekly - 1 sets - 5 reps - 20 hold  - Hooklying Hamstring Stretch with Strap  - 2 x daily - 7 x weekly - 1 sets - 5 reps - 20 hold    Manuals       PROM prone 10 10  CHRISTOPHER 15  For mmts       FOTO nv            PROM mmt 55 58  59   60      Patella mobs             IASTM             (-) pressure IASTM             MMT/infection assessment                          Mini squats             SLS    10 x :10  10 x :10 10 x :10                                Ther Ex             Heel slides 10 x :10 10\" x 10 10 x :10 10 x :10 10\" x 10  10 x :10 10 x :10      Bike for ROM  10 10  10 10' 10  10' 10'      Seated static knee flexion st      10' 10'      Standing knee flexion - at wall on step   10 10  10 x :30 hold 10 x :30                   Seated knee flexion hang- PT assist 10' pt push with opp le 10\" x 10 (opp le assist) 10 x :10 10' 10\" x 10  10 x :10 10 x :10      Wall slides -PROM " "10' w/MH and R LE push 10 with MHP  10' MH  10 min MHP  10' 10'      Quad sets      hep       SLR with quad set  2 x 15 2 x 15  2 x 15 2 x 15 2 x 10  20 20      Active knee flexion prone             Sit to stnd             S/l ABD 20   2 x 20 2 x 10  20 20      TG  10 x :30  30\" x 10  nv  10 x :30 20 20      Leg press sustained stretch  85# 5 min x 2  nv          Seated knee flexion (chair and strap)      10 min  10' 10'      Gait Training             Eccentric TM   5' 1 mph/5% 5' 1 mph 5% 5 min 1.5mph 5% 10'  5'      Standing hip 3 way    2# x 20 2# x 20 2# x 20 2# x 20      Modalities                                                                          "

## 2024-01-30 ENCOUNTER — OFFICE VISIT (OUTPATIENT)
Dept: PHYSICAL THERAPY | Facility: CLINIC | Age: 39
End: 2024-01-30
Payer: COMMERCIAL

## 2024-01-30 DIAGNOSIS — S86.811A RUPTURE OF RIGHT PATELLAR TENDON, INITIAL ENCOUNTER: Primary | ICD-10-CM

## 2024-01-30 PROCEDURE — 97110 THERAPEUTIC EXERCISES: CPT

## 2024-01-30 NOTE — PROGRESS NOTES
"Daily Note     Today's date: 2024  Patient name: Lorenzo Hebert  : 1985  MRN: [id number]  Referring provider: Quentin Jimenez PA-C  Dx: No diagnosis found.               Subjective: No change in status since last treatment session.       Objective: See treatment diary below      Assessment: Tolerated treatment well. Patient exhibited good technique with therapeutic exercises      Plan: Continue per plan of care.      Precautions:  0 - 40 degrees initially then add 10 degrees each week.      Access Code: [email or web address]  URL: [email or web address]  Date: 2023  Prepared by: Kalpana Nunes    Exercises  - Sitting Heel Slide with Towel  - 2 x daily - 7 x weekly - 1 sets - 5 reps - 20 hold  - Supine Active Ankle Pumps  - 2 x daily - 7 x weekly - 3 sets - 10 reps  - Long Sitting Calf Stretch with Strap  - 2 x daily - 7 x weekly - 1 sets - 5 reps - 20 hold  - Hooklying Hamstring Stretch with Strap  - 2 x daily - 7 x weekly - 1 sets - 5 reps - 20 hold    Manuals      PROM prone 10 10  CHRISTOPHER 15  For mmts       FOTO nv            PROM mmt 55 58  59   60 60      Patella mobs             IASTM             (-) pressure IASTM             MMT/infection assessment                          Mini squats             SLS    10 x :10  10 x :10 10 x :10 10\"x 10                                Ther Ex             Heel slides 10 x :10 10\" x 10 10 x :10 10 x :10 10\" x 10  10 x :10 10 x :10      Bike for ROM  10 10  10 10' 10  10' 10' 10      Seated static knee flexion st      10' 10' 10      Standing knee flexion - at wall on step   10 10  10 x :30 hold 10 x :30 30\" x 10                   Seated knee flexion hang- PT assist 10' pt push with opp le 10\" x 10 (opp le assist) 10 x :10 10' 10\" x 10  10 x :10 10 x :10 10\" x 10     Wall slides -PROM 10' w/MH and R LE push 10 with MHP  10' MH  10 min MHP  10' 10' 10 with MHP     Quad sets      hep       SLR with quad set  2 x 15 2 x " "15  2 x 15 2 x 15 2 x 10  20 20      Active knee flexion prone             Sit to stnd             S/l ABD 20   2 x 20 2 x 10  20 20      TG  10 x :30  30\" x 10  nv  10 x :30 20 20      Leg press sustained stretch  85# 5 min x 2  nv     100# 5 min  x 2     Seated knee flexion (chair and strap)      10 min  10' 10' 10      Gait Training             Eccentric TM   5' 1 mph/5% 5' 1 mph 5% 5 min 1.5mph 5% 10'  5' 8% 5 min      Standing hip 3 way    2# x 20 2# x 20 2# x 20 2# x 20      Modalities                                                                            "

## 2024-01-31 ENCOUNTER — APPOINTMENT (OUTPATIENT)
Dept: PHYSICAL THERAPY | Facility: CLINIC | Age: 39
End: 2024-01-31
Payer: COMMERCIAL

## 2024-02-01 ENCOUNTER — OFFICE VISIT (OUTPATIENT)
Dept: PHYSICAL THERAPY | Facility: CLINIC | Age: 39
End: 2024-02-01
Payer: COMMERCIAL

## 2024-02-01 DIAGNOSIS — S86.811A RUPTURE OF RIGHT PATELLAR TENDON, INITIAL ENCOUNTER: Primary | ICD-10-CM

## 2024-02-01 PROCEDURE — 97112 NEUROMUSCULAR REEDUCATION: CPT | Performed by: PHYSICAL THERAPIST

## 2024-02-01 PROCEDURE — 97530 THERAPEUTIC ACTIVITIES: CPT | Performed by: PHYSICAL THERAPIST

## 2024-02-01 PROCEDURE — 97110 THERAPEUTIC EXERCISES: CPT | Performed by: PHYSICAL THERAPIST

## 2024-02-01 NOTE — PROGRESS NOTES
"Daily Note     Today's date: 2024  Patient name: Lorenzo Hebert  : 1985  MRN: [id number]  Referring provider: Quentin iJmenez PA-C  Dx:   Encounter Diagnosis     ICD-10-CM    1. Rupture of right patellar tendon, initial encounter  S86.811A           Start Time: 0830  Stop Time: 1000  Total time in clinic (min): 90 minutes    Subjective: Patient reports that he does not feel swelling in the knee following 2 visits this week.       Objective: See treatment diary below      Assessment: Tolerated treatment well. Patient would benefit from continued PT      Plan: Continue per plan of care.      Precautions:  0 - 40 degrees initially then add 10 degrees each week.      Access Code: [email or web address]  URL: [email or web address]  Date: 2023  Prepared by: Kalpana Nunes    Exercises  - Sitting Heel Slide with Towel  - 2 x daily - 7 x weekly - 1 sets - 5 reps - 20 hold  - Supine Active Ankle Pumps  - 2 x daily - 7 x weekly - 3 sets - 10 reps  - Long Sitting Calf Stretch with Strap  - 2 x daily - 7 x weekly - 1 sets - 5 reps - 20 hold  - Hooklying Hamstring Stretch with Strap  - 2 x daily - 7 x weekly - 1 sets - 5 reps - 20 hold    Manuals     PROM prone 10 10  CHRISTOPHER 15  For mmts       FOTO nv            PROM mmt 55 58  59   60 60  58    Patella mobs             IASTM             (-) pressure IASTM             MMT/infection assessment                          Mini squats             SLS    10 x :10  10 x :10 10 x :10 10\"x 10  10 x :10 foam                              Ther Ex             Heel slides 10 x :10 10\" x 10 10 x :10 10 x :10 10\" x 10  10 x :10 10 x :10  10 x :10    Bike for ROM  10 10  10 10' 10  10' 10' 10  10'    Seated static knee flexion st      10' 10' 10  10'    Standing knee flexion - at wall on step   10 10  10 x :30 hold 10 x :30 30\" x 10  10 x :30                 Seated knee flexion hang- PT assist 10' pt push with opp le 10\" x 10 (opp le " "assist) 10 x :10 10' 10\" x 10  10 x :10 10 x :10 10\" x 10 10 x :10    Wall slides -PROM 10' w/MH and R LE push 10 with MHP  10' MH  10 min MHP  10' 10' 10 with MHP     Quad sets      hep       SLR with quad set  2 x 15 2 x 15  2 x 15 2 x 15 2 x 10  20 20  20    Active knee flexion prone             Sit to stnd             S/l ABD 20   2 x 20 2 x 10  20 20      TG  10 x :30  30\" x 10  nv  10 x :30 20 20 10' 10'    Leg press sustained stretch  85# 5 min x 2  nv     100# 5 min  x 2 100# 5' x 2    Seated knee flexion (chair and strap)      10 min  10' 10' 10  10    Gait Training             Eccentric TM   5' 1 mph/5% 5' 1 mph 5% 5 min 1.5mph 5% 10'  5' 8% 5 min  5'    Standing hip 3 way    2# x 20 2# x 20 2# x 20 2# x 20 2#x 20 2# x 20    Modalities                                                                              "

## 2024-02-05 ENCOUNTER — OFFICE VISIT (OUTPATIENT)
Dept: PHYSICAL THERAPY | Facility: CLINIC | Age: 39
End: 2024-02-05
Payer: COMMERCIAL

## 2024-02-05 DIAGNOSIS — S86.811A RUPTURE OF RIGHT PATELLAR TENDON, INITIAL ENCOUNTER: Primary | ICD-10-CM

## 2024-02-05 PROCEDURE — 97112 NEUROMUSCULAR REEDUCATION: CPT | Performed by: PHYSICAL THERAPIST

## 2024-02-05 PROCEDURE — 97110 THERAPEUTIC EXERCISES: CPT | Performed by: PHYSICAL THERAPIST

## 2024-02-05 PROCEDURE — 97530 THERAPEUTIC ACTIVITIES: CPT | Performed by: PHYSICAL THERAPIST

## 2024-02-05 NOTE — PROGRESS NOTES
"Daily Note     Today's date: 2024  Patient name: Lorenzo Hebert  : 1985  MRN: [id number]  Referring provider: Quentin Jimenez PA-C  Dx:   Encounter Diagnosis     ICD-10-CM    1. Rupture of right patellar tendon, initial encounter  S86.811A                      Subjective: Patient reports that he is able to ambulate without his brace without difficulty.    He reports that he gets some swelling when he is on his feet he does get some swelling in the knee.  When he rests that does resolve.      Objective: See treatment diary below      Assessment: Tolerated treatment well. Patient would benefit from continued PT      Plan: Continue per plan of care.      Precautions:  0 - 40 degrees initially then add 10 degrees each week.      Access Code: [email or web address]  URL: [email or web address]  Date: 2023  Prepared by: Kalpana Nunes    Exercises  - Sitting Heel Slide with Towel  - 2 x daily - 7 x weekly - 1 sets - 5 reps - 20 hold  - Supine Active Ankle Pumps  - 2 x daily - 7 x weekly - 3 sets - 10 reps  - Long Sitting Calf Stretch with Strap  - 2 x daily - 7 x weekly - 1 sets - 5 reps - 20 hold  - Hooklying Hamstring Stretch with Strap  - 2 x daily - 7 x weekly - 1 sets - 5 reps - 20 hold    Manuals    PROM prone 10 10  CHRISTOPHER 15  For mmts       FOTO nv            PROM mmt 55 58  59   60 60  58    Patella mobs             IASTM             (-) pressure IASTM             MMT/infection assessment                          Mini squats             SLS    10 x :10  10 x :10 10 x :10 10\"x 10  10 x :10 foam 10 x :10                             Ther Ex             Heel slides 10 x :10 10\" x 10 10 x :10 10 x :10 10\" x 10  10 x :10 10 x :10  10 x :10 10 x :10   Bike for ROM  10 10  10 10' 10  10' 10' 10  10' 10'   Seated static knee flexion st      10' 10' 10  10' 10'   Standing knee flexion - at wall on step   10 10  10 x :30 hold 10 x :30 30\" x 10  10 x :30 10 x " "x:30                Seated knee flexion hang- PT assist 10' pt push with opp le 10\" x 10 (opp le assist) 10 x :10 10' 10\" x 10  10 x :10 10 x :10 10\" x 10 10 x :10    Wall slides -PROM 10' w/MH and R LE push 10 with MHP  10' MH  10 min MHP  10' 10' 10 with MHP     Quad sets      hep       SLR with quad set  2 x 15 2 x 15  2 x 15 2 x 15 2 x 10  20 20  20 20   Active knee flexion prone             Sit to stnd             S/l ABD 20   2 x 20 2 x 10  20 20      TG  10 x :30  30\" x 10  nv  10 x :30 20 20 10' 10' 10'   Leg press sustained stretch  85# 5 min x 2  nv     100# 5 min  x 2 100# 5' x 2 100# 5' x 2    Seated knee flexion (chair and strap)      10 min  10' 10' 10  10 10   Gait Training             Eccentric TM   5' 1 mph/5% 5' 1 mph 5% 5 min 1.5mph 5% 10'  5' 8% 5 min  5' 5' 8.5%   Standing hip 3 way    2# x 20 2# x 20 2# x 20 2# x 20 2#x 20 2# x 20 2# x 20   Modalities                                                                                "

## 2024-02-06 ENCOUNTER — OFFICE VISIT (OUTPATIENT)
Dept: PHYSICAL THERAPY | Facility: CLINIC | Age: 39
End: 2024-02-06
Payer: COMMERCIAL

## 2024-02-06 DIAGNOSIS — M25.521 RIGHT ELBOW PAIN: ICD-10-CM

## 2024-02-06 DIAGNOSIS — S86.811A RUPTURE OF RIGHT PATELLAR TENDON, INITIAL ENCOUNTER: Primary | ICD-10-CM

## 2024-02-06 DIAGNOSIS — M79.601 RIGHT ARM PAIN: ICD-10-CM

## 2024-02-06 PROCEDURE — 97112 NEUROMUSCULAR REEDUCATION: CPT | Performed by: PHYSICAL THERAPIST

## 2024-02-06 PROCEDURE — 97110 THERAPEUTIC EXERCISES: CPT | Performed by: PHYSICAL THERAPIST

## 2024-02-06 PROCEDURE — 97530 THERAPEUTIC ACTIVITIES: CPT | Performed by: PHYSICAL THERAPIST

## 2024-02-06 RX ORDER — NAPROXEN 375 MG/1
375 TABLET ORAL 2 TIMES DAILY WITH MEALS
Qty: 180 TABLET | Refills: 1 | Status: SHIPPED | OUTPATIENT
Start: 2024-02-06 | End: 2024-08-04

## 2024-02-06 NOTE — PROGRESS NOTES
"Daily Note     Today's date: 2024  Patient name: Lorenzo Hebert  : 1985  MRN: [id number]  Referring provider: Quentin Jimenez PA-C  Dx:   Encounter Diagnosis     ICD-10-CM    1. Rupture of right patellar tendon, initial encounter  S86.811A                      Subjective: Patient reports that he has no change in sx.  Tolerated LV well.        Objective: See treatment diary below      Assessment: Tolerated treatment well. Patient would benefit from continued PT  Patient able to achieve a slight increase in ROM today      Plan: Continue per plan of care.      Precautions:  0 - 40 degrees initially then add 10 degrees each week.      Access Code: [email or web address]  URL: [email or web address]  Date: 2023  Prepared by: Kalpana Nunes    Exercises  - Sitting Heel Slide with Towel  - 2 x daily - 7 x weekly - 1 sets - 5 reps - 20 hold  - Supine Active Ankle Pumps  - 2 x daily - 7 x weekly - 3 sets - 10 reps  - Long Sitting Calf Stretch with Strap  - 2 x daily - 7 x weekly - 1 sets - 5 reps - 20 hold  - Hooklying Hamstring Stretch with Strap  - 2 x daily - 7 x weekly - 1 sets - 5 reps - 20 hold    Manuals    PROM prone 10 10  CHRISTOPHER 15  For mmts       FOTO nv            PROM mmt 55 58  59   60 60  58 60   Patella mobs             IASTM             (-) pressure IASTM             MMT/infection assessment             Uni RDL          x10   Mini squats          x10   SLS    10 x :10  10 x :10 10 x :10 10\"x 10  10 x :10 foam 10 x :10                             Ther Ex             Heel slides 10 x :10 10\" x 10 10 x :10 10 x :10 10\" x 10  10 x :10 10 x :10  10 x :10 10 x :10   Bike for ROM  10 10  10 10' 10  10' 10' 10  10' 10'   Seated static knee flexion st      10' 10' 10  10' 10'   Standing knee flexion - at wall on step   10 10  10 x :30 hold 10 x :30 30\" x 10  10 x :30 10 x x:30                Seated knee flexion hang- PT assist 10' pt push with opp le " "10\" x 10 (opp le assist) 10 x :10 10' 10\" x 10  10 x :10 10 x :10 10\" x 10 10 x :10 dc   Wall slides -PROM 10' w/MH and R LE push 10 with MHP  10' MH  10 min MHP  10' 10' 10 with MHP                  SLR with quad set  2 x 15 2 x 15  2 x 15 2 x 15 2 x 10  20 20  20 20 x 2#   Active knee flexion prone             Sit to stnd             S/l ABD 20   2 x 20 2 x 10  20 20   2# x 20   TG  10 x :30  30\" x 10  nv  10 x :30 20 20 10' 10' 10'   Leg press sustained stretch  85# 5 min x 2  nv     100# 5 min  x 2 100# 5' x 2 100# 5' x 2    Seated knee flexion (chair and strap)      10 min  10' 10' 10  10 10'   Gait Training             Eccentric TM   5' 1 mph/5% 5' 1 mph 5% 5 min 1.5mph 5% 10'  5' 8% 5 min  5' 5' 8.5%   Standing hip 3 way    2# x 20 2# x 20 2# x 20 2# x 20 2#x 20 2# x 20 2# x 20   Modalities                                                                                  "

## 2024-02-07 ENCOUNTER — APPOINTMENT (OUTPATIENT)
Dept: PHYSICAL THERAPY | Facility: CLINIC | Age: 39
End: 2024-02-07
Payer: COMMERCIAL

## 2024-02-08 ENCOUNTER — OFFICE VISIT (OUTPATIENT)
Dept: PHYSICAL THERAPY | Facility: CLINIC | Age: 39
End: 2024-02-08
Payer: COMMERCIAL

## 2024-02-08 DIAGNOSIS — S86.811A RUPTURE OF RIGHT PATELLAR TENDON, INITIAL ENCOUNTER: Primary | ICD-10-CM

## 2024-02-08 PROCEDURE — 97110 THERAPEUTIC EXERCISES: CPT

## 2024-02-08 NOTE — PROGRESS NOTES
"Daily Note     Today's date: 2024  Patient name: Lorenzo Hebert  : 1985  MRN: [id number]  Referring provider: Quentin Jimenez PA-C  Dx: No diagnosis found.               Subjective: MD melara/margie tomorrow       Objective: See treatment diary below      Assessment: Tolerated treatment well. Patient exhibited good technique with therapeutic exercises      Plan: Continue per plan of care.      Precautions:  0 - 40 degrees initially then add 10 degrees each week.      Access Code: [email or web address]  URL: [email or web address]  Date: 2023  Prepared by: Kalpana Nunes    Exercises  - Sitting Heel Slide with Towel  - 2 x daily - 7 x weekly - 1 sets - 5 reps - 20 hold  - Supine Active Ankle Pumps  - 2 x daily - 7 x weekly - 3 sets - 10 reps  - Long Sitting Calf Stretch with Strap  - 2 x daily - 7 x weekly - 1 sets - 5 reps - 20 hold  - Hooklying Hamstring Stretch with Strap  - 2 x daily - 7 x weekly - 1 sets - 5 reps - 20 hold    Manuals    PROM prone      For mmts       FOTO             PROM mmt       60 60  58 60   Patella mobs             IASTM             (-) pressure IASTM             MMT/infection assessment             Uni RDL 10#          x10   Mini squats 15x         x10   SLS 10\" x 10 foam      10 x :10 10 x :10 10\"x 10  10 x :10 foam 10 x :10                             Ther Ex             Heel slides     10\" x 10  10 x :10 10 x :10  10 x :10 10 x :10   Bike for ROM  10     10  10' 10' 10  10' 10'   Seated static knee flexion st 10      10' 10' 10  10' 10'   Standing knee flexion - at wall on step 30\" x 10      10 x :30 hold 10 x :30 30\" x 10  10 x :30 10 x x:30                Seated knee flexion hang- PT assist     10\" x 10  10 x :10 10 x :10 10\" x 10 10 x :10 dc   Wall slides -PROM     10 min MHP  10' 10' 10 with MHP                  SLR with quad set  20x 2#     2 x 10  20 20  20 20 x 2#   Active knee flexion prone             Sit to stnd             S/l " ABD 2# 20     2 x 10  20 20   2# x 20   TG      10 x :30 20 20 10' 10' 10'   Leg press sustained stretch 100# 5' x 2       100# 5 min  x 2 100# 5' x 2 100# 5' x 2    Seated knee flexion (chair and strap)       10 min  10' 10' 10  10 10'   Gait Training             Eccentric TM 5'  8.5%    5 min 1.5mph 5% 10'  5' 8% 5 min  5' 5' 8.5%   Standing hip 3 way 2# 20     2# x 20 2# x 20 2# x 20 2#x 20 2# x 20 2# x 20   Modalities

## 2024-02-09 ENCOUNTER — OFFICE VISIT (OUTPATIENT)
Dept: OBGYN CLINIC | Facility: CLINIC | Age: 39
End: 2024-02-09

## 2024-02-09 DIAGNOSIS — M24.661 ARTHROFIBROSIS OF KNEE JOINT, RIGHT: Primary | ICD-10-CM

## 2024-02-09 DIAGNOSIS — Z98.890 STATUS POST TENDON REPAIR: ICD-10-CM

## 2024-02-09 PROCEDURE — 99024 POSTOP FOLLOW-UP VISIT: CPT | Performed by: PHYSICIAN ASSISTANT

## 2024-02-09 RX ORDER — CHLORHEXIDINE GLUCONATE ORAL RINSE 1.2 MG/ML
15 SOLUTION DENTAL ONCE
OUTPATIENT
Start: 2024-02-09 | End: 2024-02-09

## 2024-02-09 RX ORDER — CHLORHEXIDINE GLUCONATE 4 G/100ML
SOLUTION TOPICAL DAILY PRN
Status: CANCELLED | OUTPATIENT
Start: 2024-02-09

## 2024-02-09 RX ORDER — CHLORHEXIDINE GLUCONATE ORAL RINSE 1.2 MG/ML
15 SOLUTION DENTAL ONCE
Status: CANCELLED | OUTPATIENT
Start: 2024-02-09 | End: 2024-02-09

## 2024-02-09 RX ORDER — CHLORHEXIDINE GLUCONATE 4 G/100ML
SOLUTION TOPICAL DAILY PRN
OUTPATIENT
Start: 2024-02-09

## 2024-02-09 NOTE — PROGRESS NOTES
Assessment:   Status Post  Repair Tendon Patella - Right on 11/21/2023 with arthrofibrosis of the right knee    Plan:   Weight bearing: As tolerated discontinue T ROM brace  Patient did give his informed consent for manipulation under anesthesia with arthroscopic versus possible open lysis of adhesions of the right knee today although he would like to sit and speak with his family over the weekend and he will call back Monday to discuss possibly scheduling  No evidence of infection on today's exam edema and weeping edema has resolved    Follow Up:  Postoperatively        CHIEF COMPLAINT:  Chief Complaint   Patient presents with    Right Knee - Post-op         SUBJECTIVE:  Lorenzo Hebert is a 38 y.o. year old male who presents for follow up after Repair Tendon Patella - Right. Today patient has continued stiffness of the right knee.  He is only able to flex the right knee approximately 60 degrees.  He has been going to physical therapy nearly every day at times.  He feels he is not making any progress.  It is documented by PT that he is stopped progressing as well.  Today still wearing T ROM brace unlocked to 40 degrees, this is not been progressed on the brace by PT as instructed      PHYSICAL EXAMINATION:    MUSCULOSKELETAL EXAMINATION: Right knee  General: Alert and oriented x 3, WNWD, NAD  Incision: Well-healed, mild swelling in the anterior infrapatellar region  There is no joint effusion.    Range of Motion: 0 to  degrees, straight leg raise intact and knee extension  Neurovascular status: Sensation intact to light touch L1-S1   Motor intact L1-S1  Pulses: Intact        STUDIES REVIEWED:  I have personally reviewed pertinent films in PACS.      PROCEDURES PERFORMED:  Procedures  No Procedures performed today

## 2024-02-12 ENCOUNTER — APPOINTMENT (OUTPATIENT)
Dept: PHYSICAL THERAPY | Facility: CLINIC | Age: 39
End: 2024-02-12
Payer: COMMERCIAL

## 2024-02-13 ENCOUNTER — APPOINTMENT (OUTPATIENT)
Dept: PHYSICAL THERAPY | Facility: CLINIC | Age: 39
End: 2024-02-13
Payer: COMMERCIAL

## 2024-02-15 ENCOUNTER — OFFICE VISIT (OUTPATIENT)
Dept: PHYSICAL THERAPY | Facility: CLINIC | Age: 39
End: 2024-02-15
Payer: COMMERCIAL

## 2024-02-15 DIAGNOSIS — S86.811A RUPTURE OF RIGHT PATELLAR TENDON, INITIAL ENCOUNTER: Primary | ICD-10-CM

## 2024-02-15 PROCEDURE — 97116 GAIT TRAINING THERAPY: CPT

## 2024-02-15 PROCEDURE — 97112 NEUROMUSCULAR REEDUCATION: CPT

## 2024-02-15 PROCEDURE — 97110 THERAPEUTIC EXERCISES: CPT

## 2024-02-15 NOTE — PROGRESS NOTES
"Daily Note     Today's date: 2/15/2024  Patient name: Lorenzo Hebert  : 1985  MRN: [id number]  Referring provider: Quentin Jimenez PA-C  Dx:   Encounter Diagnosis     ICD-10-CM    1. Rupture of right patellar tendon, initial encounter  S86.811A                      Subjective: Pt reports he saw MD last week, and recommended CONCEPCION.       Objective: See treatment diary below      Assessment: Tolerated treatment well. Patient would benefit from continued PT      Plan: Continue per plan of care.      Precautions:  0 - 40 degrees initially then add 10 degrees each week.      Access Code: [email or web address]  URL: [email or web address]  Date: 2023  Prepared by: Kalpana Nunes    Exercises  - Sitting Heel Slide with Towel  - 2 x daily - 7 x weekly - 1 sets - 5 reps - 20 hold  - Supine Active Ankle Pumps  - 2 x daily - 7 x weekly - 3 sets - 10 reps  - Long Sitting Calf Stretch with Strap  - 2 x daily - 7 x weekly - 1 sets - 5 reps - 20 hold  - Hooklying Hamstring Stretch with Strap  - 2 x daily - 7 x weekly - 1 sets - 5 reps - 20 hold    Manuals 2/8 02/15   1/24 1/25 1/29 1/30 2/1 2/5   PROM prone      For mmts       FOTO             PROM mmt       60 60  58 60   Patella mobs             IASTM             (-) pressure IASTM             MMT/infection assessment             Uni RDL 10#  15# 2x10        x10   Mini squats 15x 15x        x10   SLS 10\" x 10 foam  10\" x 10 foam     10 x :10 10 x :10 10\"x 10  10 x :10 foam 10 x :10                             Ther Ex             Heel slides     10\" x 10  10 x :10 10 x :10  10 x :10 10 x :10   Bike for ROM  10  10 min   10  10' 10' 10  10' 10'   Seated static knee flexion st 10  10  Min     10' 10' 10  10' 10'   Standing knee flexion - at wall on step 30\" x 10      10 x :30 hold 10 x :30 30\" x 10  10 x :30 10 x x:30                Seated knee flexion hang- PT assist     10\" x 10  10 x :10 10 x :10 10\" x 10 10 x :10 dc   Wall slides -PROM     10 min [facility]  10' 10' " 10 with MHP                  SLR with quad set  20x 2#  20x 2#   2 x 10  20 20  20 20 x 2#   Active knee flexion prone             Sit to stnd             S/l ABD 2# 20  2# 20   2 x 10  20 20   2# x 20   TG      10 x :30 20 20 10' 10' 10'   Leg press sustained stretch 100# 5' x 2 115# 5' x2      100# 5 min  x 2 100# 5' x 2 100# 5' x 2    Seated knee flexion (chair and strap)       10 min  10' 10' 10  10 10'   Gait Training             Eccentric TM 5'  8.5% 10 min    5 min 1.5mph 5% 10'  5' 8% 5 min  5' 5' 8.5%   Standing hip 3 way 2# 20  2# 20    2# x 20 2# x 20 2# x 20 2#x 20 2# x 20 2# x 20   Modalities

## 2024-02-19 ENCOUNTER — APPOINTMENT (OUTPATIENT)
Dept: PHYSICAL THERAPY | Facility: CLINIC | Age: 39
End: 2024-02-19
Payer: COMMERCIAL

## 2024-02-20 ENCOUNTER — APPOINTMENT (OUTPATIENT)
Dept: PHYSICAL THERAPY | Facility: CLINIC | Age: 39
End: 2024-02-20
Payer: COMMERCIAL

## 2024-02-22 ENCOUNTER — APPOINTMENT (OUTPATIENT)
Dept: PHYSICAL THERAPY | Facility: CLINIC | Age: 39
End: 2024-02-22
Payer: COMMERCIAL

## 2024-02-23 ENCOUNTER — APPOINTMENT (OUTPATIENT)
Dept: PHYSICAL THERAPY | Facility: CLINIC | Age: 39
End: 2024-02-23
Payer: COMMERCIAL

## 2024-02-29 ENCOUNTER — APPOINTMENT (OUTPATIENT)
Dept: PHYSICAL THERAPY | Facility: CLINIC | Age: 39
End: 2024-02-29
Payer: COMMERCIAL

## 2024-03-01 ENCOUNTER — APPOINTMENT (OUTPATIENT)
Dept: PHYSICAL THERAPY | Facility: CLINIC | Age: 39
End: 2024-03-01
Payer: COMMERCIAL

## 2024-03-04 ENCOUNTER — APPOINTMENT (OUTPATIENT)
Dept: PHYSICAL THERAPY | Facility: CLINIC | Age: 39
End: 2024-03-04
Payer: COMMERCIAL

## 2024-03-05 ENCOUNTER — APPOINTMENT (OUTPATIENT)
Dept: PHYSICAL THERAPY | Facility: CLINIC | Age: 39
End: 2024-03-05
Payer: COMMERCIAL

## 2024-03-06 ENCOUNTER — APPOINTMENT (OUTPATIENT)
Dept: PHYSICAL THERAPY | Facility: CLINIC | Age: 39
End: 2024-03-06
Payer: COMMERCIAL

## 2024-03-07 ENCOUNTER — EVALUATION (OUTPATIENT)
Dept: PHYSICAL THERAPY | Facility: CLINIC | Age: 39
End: 2024-03-07
Payer: COMMERCIAL

## 2024-03-07 DIAGNOSIS — S86.811A RUPTURE OF RIGHT PATELLAR TENDON, INITIAL ENCOUNTER: Primary | ICD-10-CM

## 2024-03-07 PROCEDURE — 97140 MANUAL THERAPY 1/> REGIONS: CPT | Performed by: PHYSICAL THERAPIST

## 2024-03-07 PROCEDURE — 97110 THERAPEUTIC EXERCISES: CPT | Performed by: PHYSICAL THERAPIST

## 2024-03-07 NOTE — LETTER
2024      No Recipients    Patient: Lorenzo Hebert   YOB: 1985   Date of Visit: 3/7/2024     Encounter Diagnosis     ICD-10-CM    1. Rupture of right patellar tendon, initial encounter  S86.811A           Dear Dr. Vines:    Thank you for your recent referral of Lorenzo Hebert. Please review the attached evaluation summary from Lorenzo's recent visit.     Please verify that you agree with the plan of care by signing the attached order.     If you have any questions or concerns, please do not hesitate to call.     I sincerely appreciate the opportunity to share in the care of one of your patients and hope to have another opportunity to work with you in the near future.       Sincerely,    Kalpana Nunes, PT      Referring Provider:      I certify that I have read the below Plan of Care and certify the need for these services furnished under this plan of treatment while under my care.                    Timmy Vines  [address] [address]  [facility]          Daily Note     Today's date: 3/7/2024  Patient name: Lorenzo Hebert  : 1985  MRN: [id number]  Referring provider: Quentin Jimenez PA-C  Dx:   Encounter Diagnosis     ICD-10-CM    1. Rupture of right patellar tendon, initial encounter  S86.811A                      Subjective: see REV      Objective: See treatment diary below      Assessment: Tolerated treatment well. Patient would benefit from continued PT      Plan: Continue per plan of care.     Beginning 3/7/24     5 x a week x 2 weeks  3 x a week x 2 weeks   2 weeks for 4 weeks      Access Code: [email or web address]  URL: [email or web address]  Date: 2023  Prepared by: Kalpana Nunes    Exercises  - Sitting Heel Slide with Towel  - 2 x daily - 7 x weekly - 1 sets - 5 reps - 20 hold  - Supine Active Ankle Pumps  - 2 x daily - 7 x weekly - 3 sets - 10 reps  - Long Sitting Calf Stretch with Strap  - 2 x daily - 7 x weekly - 1 sets - 5 reps -  "20 hold  - Hooklying Hamstring Stretch with Strap  - 2 x daily - 7 x weekly - 1 sets - 5 reps - 20 hold    Manuals 2/8 02/15 3/7          PROM prone             FOTO             REV   30          Patella mobs                                                                              SLS 10\" x 10 foam  10\" x 10 foam  nv                                    Ther Ex             Heel slides             Bike for ROM  10  10 min 10'          Seated static knee flexion st 10  10  Min  10x          Standing knee flexion - at wall on step 30\" x 10   x10          SLR   x10          QS  \ 10 x :05          SAQ   10 x :05          SL ABD              BFR with SLR             Mini squats   10                                                                                        Eccentric TM 5'  8.5% 10 min  nv          Standing hip 3 way 2# 20  2# 20  nv          Modalities                                                                                          PT Evaluation     Today's date: 3/7/24  Patient name: Lorenzo Hebert  : 1985  MRN: [id number]  Referring provider: Quentin Jimenez PA-C  Dx:   Encounter Diagnosis     ICD-10-CM    1. Rupture of right patellar tendon, initial encounter  S86.811A Ambulatory Referral to Physical Therapy                     Assessment:    Lorenzo returns to physical therapy s/p R CONCEPCION on 3/5/24.  He presents with improved A/PROM since LV.  He has mild swelling, as well as pain and reduced strength and ROM.  He would benefit from skilled physical therapy to achieve full functional activity level and ROM and strength as per MD guidelines.  He has been given an hep and is in agreement with plan of care.     Assessment details: Lorenzo Hebert is a 38 y.o. male with s/p repair of right patellar tendon rupture.  He presents with pain, decreased strength, decreased ROM, and ambulatory dysfunction  Due to these impairments, patient has difficulty performing a/iadls, recreational " activities and engaging in social activities. Patient's clinical presentation is consistent with their referring diagnosis. Patient would benefit from skilled physical therapy to address their aforementioned impairments, improve their level of function and to improve their overall quality of life.  has been given a home exercise program and is in agreement with the plan of care.  Thank you for your referral.     Impairments: abnormal gait, abnormal muscle tone, abnormal or restricted ROM, activity intolerance, impaired balance, impaired physical strength, lacks appropriate home exercise program and pain with function    Symptom irritability: moderateUnderstanding of Dx/Px/POC: excellent  Goals  ST Goals - 2-4 weeks  1. Patient will report decreased pain with activity by at least 2 points within 4 weeks   2. Patient will improve ROM to by 25% in 2 weeks  4.  Patient will perform ADLs in 2 weeks with reduced pain  5.  Patient will increase strength by 25%  in 2-4 weeks    LT Goals - Discharge  1. Patient will improve FOTO score to maximum stated or greater by discharge  2. Patient will return to preferred recreational activity without significant pain increase by discharge   3.  Patient will return to all work related activities without pain by discharge      Plan  Patient would benefit from: skilled physical therapy  Referral necessary: No  Planned therapy interventions: joint mobilization, manual therapy, neuromuscular re-education, strengthening, stretching, therapeutic activities, therapeutic exercise and home exercise program  Frequency: 2x week  Duration in visits: 20  Duration in weeks: 20  Plan of Care beginning date: 3/7/24  Plan of Care expiration date: 6/7/24  Treatment plan discussed with: patient        Subjective Evaluation    History of Present Illness  Mechanism of injury: Patient reports that he was playing basketball, and jumped in the air, hitting another player.  He heard a pop in the R knee. He  drove himself to the ER from [place] to [facility].  He was put in a brace and given crutches and referred to ortho outpatient. He was referred to ortho who diagnosed a patellar tendon rupture.  He had surgery to repair the tendon on .  He presents to PT today in his P ROM brace.    CC:  He reports that he has little pain.  He states that he might get quick sharp pains.  He is not taking any pain medication at this time.    Function:  He has been sleeping on the couch and is going to attempt to sleep in bed tonight.   He did drive himself.  To PT today.   He is taking the brace off only to shower. He is going up the stairs once or twice a day.  He works for [employer] but is not working now.  He will be able to sit at work when he returns.   He would like to return to playing sports.  He would like to be able to workout.        3/7/24:  Patient returns to physical therapy following CONCEPCION on 3/5/24.  He reports pain and swelling in his knee. He states that he also has swelling in the knee today.  He reports that he had 120 degrees which has gone down to 110 with flexionator type machine.  He is to use the machine 2 hours 3 x a day.      Patient Goals  Patient goals for therapy: decreased edema, decreased pain, increased motion, increased strength, independence with ADLs/IADLs and return to sport/leisure activities    Pain  Current pain ratin  At best pain ratin  At worst pain rating: 3  Quality: sharp  Relieving factors: ice  Progression: improved    Social Support  Steps to enter house: yes  Stairs in house: yes       Diagnostic Tests  X-ray: abnormal  MRI studies: abnormal      Objective     Observations     Right Knee   Positive for atrophy, effusion and incision.     Additional Observation Details  Well healing incision.  Mild effusion noted.      Tenderness   Left Knee   Tenderness in the patellar tendon.     Active Range of Motion                                                   Left Knee   Flexion:  130 degrees   Prone flexion: 0 degrees     Passive Range of Motion                          3/7/24    Right Knee   Flexion: 28 degrees                           48 degrees relaxed in sitting/ 82 degrees PROM in sitting  Supine Flexion                                 82 degrees  Extension: -9 degrees                        -5 degrees  Extension Lag                                    10 degrees    Strength    Flexion:                                                4+/5                                                                                Extension:                                            4-  SLR:                                                      3+      Mobility   Patellar Mobility:     Right Knee   Hypomobile: medial, lateral, superior and inferior     Additional Mobility Details  Effusion appears to be limiting flexibility.    Strength/Myotome Testing     Left Knee   Normal strength    Swelling     Left Knee Girth Measurement (cm)   Joint line: 37 cm  10 cm above joint line: 44.5 cm  10 cm below joint line: 39 cm    Right Knee Girth Measurement (cm)                   3/7/24    Joint line: 44.5 cm                                              43.5 cm  10 cm above joint line: 45 cm  10 cm below joint line: 38 cm

## 2024-03-07 NOTE — PROGRESS NOTES
PT Evaluation     Today's date: 3/7/24  Patient name: Lorenzo Hebert  : 1985  MRN: [id number]  Referring provider: Quentin Jimenez PA-C  Dx:   Encounter Diagnosis     ICD-10-CM    1. Rupture of right patellar tendon, initial encounter  S86.811A Ambulatory Referral to Physical Therapy                     Assessment:    Lorenzo returns to physical therapy s/p R CONCEPCION on 3/5/24.  He presents with improved A/PROM since LV.  He has mild swelling, as well as pain, reduced strength, ROM. He presents with moderate functional deficits.   He would benefit from skilled physical therapy to achieve full functional activity level and ROM and strength as per MD guidelines.  He has been given an hep and is in agreement with plan of care.     Assessment details: Lorenzo Hebert is a 38 y.o. male with s/p repair of right patellar tendon rupture.  He presents with pain, decreased strength, decreased ROM, and ambulatory dysfunction  Due to these impairments, patient has difficulty performing a/iadls, recreational activities and engaging in social activities. Patient's clinical presentation is consistent with their referring diagnosis. Patient would benefit from skilled physical therapy to address their aforementioned impairments, improve their level of function and to improve their overall quality of life.  has been given a home exercise program and is in agreement with the plan of care.  Thank you for your referral.     Impairments: abnormal gait, abnormal muscle tone, abnormal or restricted ROM, activity intolerance, impaired balance, impaired physical strength, lacks appropriate home exercise program and pain with function    Symptom irritability: moderateUnderstanding of Dx/Px/POC: excellent  Goals  ST Goals - 2-4 weeks  1. Patient will report decreased pain with activity by at least 2 points within 4 weeks   2. Patient will improve ROM to by 25% in 2 weeks  4.  Patient will perform ADLs in 2 weeks with reduced  pain  5.  Patient will increase strength by 25%  in 2-4 weeks    LT Goals - Discharge  1. Patient will improve FOTO score to maximum stated or greater by discharge  2. Patient will return to preferred recreational activity without significant pain increase by discharge   3.  Patient will return to all work related activities without pain by discharge      Plan  Patient would benefit from: skilled physical therapy  Referral necessary: No  Planned therapy interventions: joint mobilization, manual therapy, neuromuscular re-education, strengthening, stretching, therapeutic activities, therapeutic exercise and home exercise program  Frequency: 2x week  Duration in visits: 20  Duration in weeks: 20  Plan of Care beginning date: 3/7/24  Plan of Care expiration date: 6/7/24  Treatment plan discussed with: patient        Subjective Evaluation    History of Present Illness  Mechanism of injury: Patient reports that he was playing basketball, and jumped in the air, hitting another player.  He heard a pop in the R knee. He drove himself to the ER from [facility].  He was put in a brace and given crutches and referred to ortho outpatient. He was referred to ortho who diagnosed a patellar tendon rupture.  He had surgery to repair the tendon on 11/21.  He presents to PT today in his P ROM brace.    CC:  He reports that he has little pain.  He states that he might get quick sharp pains.  He is not taking any pain medication at this time.    Function:  He has been sleeping on the couch and is going to attempt to sleep in bed tonight.   He did drive himself.  To PT today.   He is taking the brace off only to shower. He is going up the stairs once or twice a day.  He works for [employer] but is not working now.  He will be able to sit at work when he returns.   He would like to return to playing sports.  He would like to be able to workout.        3/7/24:  Patient returns to physical therapy following CONCEPCION on 3/5/24.  He  reports pain and swelling in his knee. He states that he also has swelling in the knee today.  He reports that he had 120 degrees which has gone down to 110 with flexionator type machine.  He is to use the machine 2 hours 3 x a day.      Patient Goals  Patient goals for therapy: decreased edema, decreased pain, increased motion, increased strength, independence with ADLs/IADLs and return to sport/leisure activities    Pain  Current pain ratin  At best pain ratin  At worst pain rating: 3  Quality: sharp  Relieving factors: ice  Progression: improved    Social Support  Steps to enter house: yes  Stairs in house: yes       Diagnostic Tests  X-ray: abnormal  MRI studies: abnormal      Objective     Observations     Right Knee   Positive for atrophy, and effusion.  Well healed incision.     Tenderness   Left Knee   Tenderness in the patellar tendon.     Active Range of Motion                                                   Left Knee   Flexion: 130 degrees   Prone flexion: 0 degrees     Passive Range of Motion                          3/7/24    Right Knee   Flexion: 28 degrees                           48 degrees relaxed in sitting/ 82 degrees PROM in sitting  Supine Flexion                                 82 degrees  Extension: -9 degrees                        -5 degrees  Extension Lag                                    10 degrees    Strength    Flexion:                                                4+/5                                                                                Extension:                                            4-  SLR:                                                      3+      Mobility   Patellar Mobility:                                Reduced patellar mobility    Swelling     Left Knee Girth Measurement (cm)   Joint line: 37 cm  10 cm above joint line: 44.5 cm  10 cm below joint line: 39 cm    Right Knee Girth Measurement (cm)                   3/7/24    Joint line: 44.5 cm                                               43.5 cm  10 cm above joint line: 45 cm  10 cm below joint line: 38 cm

## 2024-03-07 NOTE — PROGRESS NOTES
"Daily Note     Today's date: 3/7/2024  Patient name: Lorenzo Hebert  : 1985  MRN: [id number]  Referring provider:   Dx:   Encounter Diagnosis     ICD-10-CM    1. Rupture of right patellar tendon, initial encounter  S86.811A                      Subjective: see REV      Objective: See treatment diary below      Assessment: Tolerated treatment well. Patient would benefit from continued PT      Plan: Continue per plan of care.     Beginning 3/7/24     5 x a week x 2 weeks  3 x a week x 2 weeks   2 weeks for 4 weeks      Access Code: [email or web address]  URL: [email or web address]  Date: 2023  Prepared by: Kalpana Nunes    Manuals 2/8 02/15 3/7          PROM  flex/ext   20'          FOTO             REV   30          Patella mobs   nv                                                                           SLS 10\" x 10 foam  10\" x 10 foam  nv                                    Ther Ex             Heel slides   10 x :10          Bike for ROM  10  10 min 10'          Seated static knee flexion st (PROM) 10  10  Min  10x          Standing knee flexion - at wall on step (lunge) 30\" x 10   nv          SLR   x10          QS  \ 10 x :05          SAQ   10 x :05          SL ABD    nv          BFR with SLR   nv          Mini squats   10          TKE   nv          Eccentric TM   nv          Standing hip 3 way   nv          Wall Slides ROM   nv                                                              Modalities             ice   10'                                                                        "

## 2024-03-07 NOTE — LETTER
"2024    Timmy Morrell  [address] [id number]    Patient: Lorenzo Hebert   YOB: 1985   Date of Visit: 3/7/2024     Encounter Diagnosis     ICD-10-CM    1. Rupture of right patellar tendon, initial encounter  S86.811A           Dear Dr. Morrell:    Thank you for your recent referral of Lorenzo Hebert. Please review the attached evaluation summary from Lorenzo's recent visit.     Please verify that you agree with the plan of care by signing the attached order.     If you have any questions or concerns, please do not hesitate to call.     I sincerely appreciate the opportunity to share in the care of one of your patients and hope to have another opportunity to work with you in the near future.       Sincerely,    Kalpana Nunes, PT      Referring Provider:      I certify that I have read the below Plan of Care and certify the need for these services furnished under this plan of treatment while under my care.                    Timmy Morrell  [address] [place], [place] [address]  Via Mail          Daily Note     Today's date: 3/7/2024  Patient name: Lorenzo Hebert  : 1985  MRN: [id number]  Referring provider:   Dx:   Encounter Diagnosis     ICD-10-CM    1. Rupture of right patellar tendon, initial encounter  S86.811A                      Subjective: see REV      Objective: See treatment diary below      Assessment: Tolerated treatment well. Patient would benefit from continued PT      Plan: Continue per plan of care.     Beginning 3/7/24     5 x a week x 2 weeks  3 x a week x 2 weeks   2 weeks for 4 weeks      Access Code: [email or web address]  URL: [email or web address]  Date: 2023  Prepared by: Kalpana Nunes    Manuals 2/8 02/15 3/7          PROM  flex/ext   20'          FOTO             REV   30          Patella mobs   nv                                                                           SLS 10\" x 10 foam  10\" x 10 foam  nv                     " "               Ther Ex             Heel slides   10 x :10          Bike for ROM  10  10 min 10'          Seated static knee flexion st (PROM) 10  10  Min  10x          Standing knee flexion - at wall on step (lunge) 30\" x 10   nv          SLR   x10          QS  \ 10 x :05          SAQ   10 x :05          SL ABD    nv          BFR with SLR   nv          Mini squats   10          TKE   nv          Eccentric TM   nv          Standing hip 3 way   nv          Wall Slides ROM   nv                                                              Modalities             ice   10'                                                                          PT Evaluation     Today's date: 3/7/24  Patient name: Lorenzo Hebert  : 1985  MRN: [id number]  Referring provider: Quentin Jimenez PA-C  Dx:   Encounter Diagnosis     ICD-10-CM    1. Rupture of right patellar tendon, initial encounter  S86.811A Ambulatory Referral to Physical Therapy                     Assessment:    Lorenzo returns to physical therapy s/p R [facility] on 3/5/24.  He presents with improved A/PROM since LV.  He has mild swelling, as well as pain and reduced strength and ROM.  He would benefit from skilled physical therapy to achieve full functional activity level and ROM and strength as per MD guidelines.  He has been given an hep and is in agreement with plan of care.     Assessment details: Lorenzo Hebert is a 38 y.o. male with s/p repair of right patellar tendon rupture.  He presents with pain, decreased strength, decreased ROM, and ambulatory dysfunction  Due to these impairments, patient has difficulty performing a/iadls, recreational activities and engaging in social activities. Patient's clinical presentation is consistent with their referring diagnosis. Patient would benefit from skilled physical therapy to address their aforementioned impairments, improve their level of function and to improve their overall quality of life.  has been given a home " exercise program and is in agreement with the plan of care.  Thank you for your referral.     Impairments: abnormal gait, abnormal muscle tone, abnormal or restricted ROM, activity intolerance, impaired balance, impaired physical strength, lacks appropriate home exercise program and pain with function    Symptom irritability: moderateUnderstanding of Dx/Px/POC: excellent  Goals  ST Goals - 2-4 weeks  1. Patient will report decreased pain with activity by at least 2 points within 4 weeks   2. Patient will improve ROM to by 25% in 2 weeks  4.  Patient will perform ADLs in 2 weeks with reduced pain  5.  Patient will increase strength by 25%  in 2-4 weeks    LT Goals - Discharge  1. Patient will improve FOTO score to maximum stated or greater by discharge  2. Patient will return to preferred recreational activity without significant pain increase by discharge   3.  Patient will return to all work related activities without pain by discharge      Plan  Patient would benefit from: skilled physical therapy  Referral necessary: No  Planned therapy interventions: joint mobilization, manual therapy, neuromuscular re-education, strengthening, stretching, therapeutic activities, therapeutic exercise and home exercise program  Frequency: 2x week  Duration in visits: 20  Duration in weeks: 20  Plan of Care beginning date: 3/7/24  Plan of Care expiration date: 6/7/24  Treatment plan discussed with: patient        Subjective Evaluation    History of Present Illness  Mechanism of injury: Patient reports that he was playing basketball, and jumped in the air, hitting another player.  He heard a pop in the R knee. He drove himself to the ER from [facility].  He was put in a brace and given crutches and referred to ortho outpatient. He was referred to ortho who diagnosed a patellar tendon rupture.  He had surgery to repair the tendon on 11/21.  He presents to PT today in his P ROM brace.    CC:  He reports that he has little  pain.  He states that he might get quick sharp pains.  He is not taking any pain medication at this time.    Function:  He has been sleeping on the couch and is going to attempt to sleep in bed tonight.   He did drive himself.  To PT today.   He is taking the brace off only to shower. He is going up the stairs once or twice a day.  He works for [employer] but is not working now.  He will be able to sit at work when he returns.   He would like to return to playing sports.  He would like to be able to workout.        3/7/24:  Patient returns to physical therapy following [facility] on 3/5/24.  He reports pain and swelling in his knee. He states that he also has swelling in the knee today.  He reports that he had 120 degrees which has gone down to 110 with flexionator type machine.  He is to use the machine 2 hours 3 x a day.      Patient Goals  Patient goals for therapy: decreased edema, decreased pain, increased motion, increased strength, independence with ADLs/IADLs and return to sport/leisure activities    Pain  Current pain ratin  At best pain ratin  At worst pain rating: 3  Quality: sharp  Relieving factors: ice  Progression: improved    Social Support  Steps to enter house: yes  Stairs in house: yes       Diagnostic Tests  X-ray: abnormal  MRI studies: abnormal      Objective     Observations     Right Knee   Positive for atrophy, effusion and incision.     Additional Observation Details  Well healing incision.  Mild effusion noted.      Tenderness   Left Knee   Tenderness in the patellar tendon.     Active Range of Motion                                                   Left Knee   Flexion: 130 degrees   Prone flexion: 0 degrees     Passive Range of Motion                          3/7/24    Right Knee   Flexion: 28 degrees                           48 degrees relaxed in sitting/ 82 degrees PROM in sitting  Supine Flexion                                 82 degrees  Extension: -9 degrees                        -5  degrees  Extension Lag                                    10 degrees    Strength    Flexion:                                                4+/5                                                                                Extension:                                            4-  SLR:                                                      3+      Mobility   Patellar Mobility:     Right Knee   Hypomobile: medial, lateral, superior and inferior     Additional Mobility Details  Effusion appears to be limiting flexibility.    Strength/Myotome Testing     Left Knee   Normal strength    Swelling     Left Knee Girth Measurement (cm)   Joint line: 37 cm  10 cm above joint line: 44.5 cm  10 cm below joint line: 39 cm    Right Knee Girth Measurement (cm)                   3/7/24    Joint line: 44.5 cm                                              43.5 cm  10 cm above joint line: 45 cm  10 cm below joint line: 38 cm

## 2024-03-08 ENCOUNTER — APPOINTMENT (OUTPATIENT)
Dept: PHYSICAL THERAPY | Facility: CLINIC | Age: 39
End: 2024-03-08
Payer: COMMERCIAL

## 2024-03-11 ENCOUNTER — APPOINTMENT (OUTPATIENT)
Dept: PHYSICAL THERAPY | Facility: CLINIC | Age: 39
End: 2024-03-11
Payer: COMMERCIAL

## 2024-03-11 ENCOUNTER — TELEPHONE (OUTPATIENT)
Dept: OTHER | Facility: HOSPITAL | Age: 39
End: 2024-03-11

## 2024-03-11 NOTE — TELEPHONE ENCOUNTER
Attempted to contact patient by phone as well as MyChart  by myself as well as Dr. Cm.  Patient underwent manipulation under anesthesia with another provider as evidenced through chart review.     Agustina Morales PAC

## 2024-03-12 ENCOUNTER — OFFICE VISIT (OUTPATIENT)
Dept: PHYSICAL THERAPY | Facility: CLINIC | Age: 39
End: 2024-03-12
Payer: COMMERCIAL

## 2024-03-12 DIAGNOSIS — S86.811A RUPTURE OF RIGHT PATELLAR TENDON, INITIAL ENCOUNTER: Primary | ICD-10-CM

## 2024-03-12 PROCEDURE — 97140 MANUAL THERAPY 1/> REGIONS: CPT | Performed by: PHYSICAL THERAPIST

## 2024-03-12 PROCEDURE — 97110 THERAPEUTIC EXERCISES: CPT | Performed by: PHYSICAL THERAPIST

## 2024-03-12 NOTE — PROGRESS NOTES
"Daily Note     Today's date: 3/12/2024  Patient name: Lorenzo Hebert  : 1985  MRN: [id number]  Referring provider: Timmy Vines  Dx:   Encounter Diagnosis     ICD-10-CM    1. Rupture of right patellar tendon, initial encounter  S86.811A                      Subjective: Patient reports that he has been ill for the last several days.  He has used his CPM for the last several days, but reports that his knee continues to be fairly swollen.        Objective: See treatment diary below      Assessment: Tolerated treatment well. Patient would benefit from continued PT  PROM approx 90 degrees of flexion today      Plan: Continue per plan of care.      5 x a week x 2 weeks  3 x a week x 2 weeks   2 weeks for 4 weeks      Access Code: [email or web address]  URL: [email or web address]  Date: 2023  Prepared by: Kalpana Nunes    Manuals 2/8 02/15 3/7 3/12         PROM  flex/ext   20' Prone 10'         FOTO             REV   30          JM mobs   nv 5'                                                                          SLS 10\" x 10 foam  10\" x 10 foam  nv 10 x :10                                   Ther Ex             Heel slides   10 x :10 10 x :10         Bike for ROM  10  10 min 10' 10'         Seated static knee flexion st (PROM) 10  10  Min  10x dc         Standing knee flexion - at wall on step (lunge) 30\" x 10   nv 10 x :10         SLR   x10 x10         QS  \ 10 x :05 10 x :05         SAQ   10 x :05 10 x :05         SL ABD    nv          BFR with SLR   nv          Mini squats   10          TKE   nv          Eccentric TM   nv          Standing hip 3 way   nv          Wall Slides ROM   nv          LAQ    10 x :05                                                Modalities             ice   10' 10                                                                         "

## 2024-03-13 ENCOUNTER — OFFICE VISIT (OUTPATIENT)
Dept: PHYSICAL THERAPY | Facility: CLINIC | Age: 39
End: 2024-03-13
Payer: COMMERCIAL

## 2024-03-13 DIAGNOSIS — S86.811A RUPTURE OF RIGHT PATELLAR TENDON, INITIAL ENCOUNTER: Primary | ICD-10-CM

## 2024-03-13 PROCEDURE — 97110 THERAPEUTIC EXERCISES: CPT

## 2024-03-13 PROCEDURE — 97140 MANUAL THERAPY 1/> REGIONS: CPT

## 2024-03-13 NOTE — PROGRESS NOTES
"Daily Note     Today's date: 3/13/2024  Patient name: Lorenzo Hebert  : 1985  MRN: [id number]  Referring provider: Quentin Jimenez PA-C  Dx:   Encounter Diagnosis     ICD-10-CM    1. Rupture of right patellar tendon, initial encounter  S86.811A                      Subjective: No complaints to begin treatment       Objective: See treatment diary below      Assessment: Tolerated treatment well. Patient exhibited good technique with therapeutic exercises      Plan: Continue per plan of care.      5 x a week x 2 weeks  3 x a week x 2 weeks   2 weeks for 4 weeks      Access Code: [email or web address]  URL: [email or web address]  Date: 2023  Prepared by: Kalpana Nunes    Manuals 2/8 02/15 3/7 3/12 3/13        PROM  flex/ext   20' Prone 10' 10 prone        FOTO             REV   30          JM mobs   nv 5'                                                                          SLS 10\" x 10 foam  10\" x 10 foam  nv 10 x :10                                   Ther Ex             Heel slides   10 x :10 10 x :10         Bike for ROM  10  10 min 10' 10' 10         Seated static knee flexion st (PROM) 10  10  Min  10x dc DC        Standing knee flexion - at wall on step (lunge) 30\" x 10   nv 10 x :10         SLR   x10 x10 BFR        QS  \ 10 x :05 10 x :05         SAQ   10 x :05 10 x :05         SL ABD    nv          BFR with SLR   nv          Mini squats   10          TKE   nv          Eccentric TM   nv          Standing hip 3 way   nv          Wall Slides ROM   nv          LAQ     5\" x 10         Leg press stretch      2 holes seated 60# 10\" x 10                                   Modalities             ice   10' 10 NV                                                                          "

## 2024-03-14 ENCOUNTER — OFFICE VISIT (OUTPATIENT)
Dept: PHYSICAL THERAPY | Facility: CLINIC | Age: 39
End: 2024-03-14
Payer: COMMERCIAL

## 2024-03-14 DIAGNOSIS — S86.811A RUPTURE OF RIGHT PATELLAR TENDON, INITIAL ENCOUNTER: Primary | ICD-10-CM

## 2024-03-14 PROCEDURE — 97140 MANUAL THERAPY 1/> REGIONS: CPT

## 2024-03-14 PROCEDURE — 97110 THERAPEUTIC EXERCISES: CPT

## 2024-03-14 NOTE — PROGRESS NOTES
"Daily Note     Today's date: 3/14/2024  Patient name: Lorenzo Hebert  : 1985  MRN: [id number]  Referring provider: Quentin Jimenez PA-C  Dx:   Encounter Diagnosis     ICD-10-CM    1. Rupture of right patellar tendon, initial encounter  S86.811A                      Subjective: Swelling in R knee remains. Advised to wear compression stocking       Objective: See treatment diary below      Assessment: Tolerated treatment well. Patient exhibited good technique with therapeutic exercises      Plan: Continue per plan of care.      5 x a week x 2 weeks  3 x a week x 2 weeks   2 weeks for 4 weeks      Access Code: [email or web address]  URL: [email or web address]  Date: 2023  Prepared by: Kalpana Nunes    Manuals 2/8 02/15 3/7 3/12 3/13 3/14       PROM  flex/ext   20' Prone 10' 10 prone 10 prone       FOTO             REV   30          JM mobs   nv 5'                                                                          SLS 10\" x 10 foam  10\" x 10 foam  nv 10 x :10                                   Ther Ex             Heel slides   10 x :10 10 x :10  10\" x 10        Bike for ROM  10  10 min 10' 10' 10  10        Seated static knee flexion st (PROM) 10  10  Min  10x dc DC        Standing knee flexion - at wall on step (lunge) 30\" x 10   nv 10 x :10  10\" x 10        Prone quad stretch      20\" x 5        Sit to stand knee flexion stretch       10\" x 10        SLR   x10 x10 BFR        QS  \ 10 x :05 10 x :05         SAQ   10 x :05 10 x :05                      SL ABD    nv          BFR with SLR   nv          Mini squats   10          TKE   nv          Eccentric TM   nv          Standing hip 3 way   nv          Wall Slides ROM   nv          LAQ     5\" x 10  BFR       Leg press stretch      2 holes seated 60# 10\" x 10  2 holes seated 60# 10\" x 10                                  Modalities             ice   10' 10 NV 10                                                                           "

## 2024-03-15 ENCOUNTER — OFFICE VISIT (OUTPATIENT)
Dept: PHYSICAL THERAPY | Facility: CLINIC | Age: 39
End: 2024-03-15
Payer: COMMERCIAL

## 2024-03-15 DIAGNOSIS — S86.811A RUPTURE OF RIGHT PATELLAR TENDON, INITIAL ENCOUNTER: Primary | ICD-10-CM

## 2024-03-15 PROCEDURE — 97110 THERAPEUTIC EXERCISES: CPT | Performed by: PHYSICAL THERAPIST

## 2024-03-15 PROCEDURE — 97140 MANUAL THERAPY 1/> REGIONS: CPT | Performed by: PHYSICAL THERAPIST

## 2024-03-15 NOTE — PROGRESS NOTES
"Daily Note     Today's date: 3/15/2024  Patient name: Lorenzo Hebert  : 1985  MRN: [id number]  Referring provider: Quentin Jimenez PA-C  Dx:   Encounter Diagnosis     ICD-10-CM    1. Rupture of right patellar tendon, initial encounter  S86.811A                      Subjective: Reports compliance with use of CPM at home.        Objective: See treatment diary below  Seated AAROM flexion - 93    Assessment: Muscle guarding evident.  ROM improving.        Plan: Continue per plan of care.      5 x a week x 2 weeks  3 x a week x 2 weeks   2 weeks for 4 weeks      Access Code: [email or web address]  URL: [email or web address]  Date: 2023  Prepared by: Kalpana Nunes    Manuals 2/8 02/15 3/7 3/12 3/13 3/14 3/15      PROM  flex/ext   20' Prone 10' 10 prone 10 prone CHRISTOPHER 10'       FOTO             REV   30          JM mobs   nv 5'                                                                          SLS 10\" x 10 foam  10\" x 10 foam  nv 10 x :10                                   Ther Ex             Heel slides   10 x :10 10 x :10  10\" x 10  On wall 5' 5#      Bike for ROM  10  10 min 10' 10' 10  10  10'      Seated static knee flexion st (PROM) 10  10  Min  10x dc DC        Standing knee flexion - at wall on step (lunge) 30\" x 10   nv 10 x :10  10\" x 10  10s x20      Prone quad stretch      20\" x 5  20s x5      Sit to stand knee flexion stretch       10\" x 10        SLR   x10 x10 BFR        QS  \ 10 x :05 10 x :05         SAQ   10 x :05 10 x :05         Quadraped       5s x20      SL ABD    nv          BFR with SLR   nv          Mini squats   10          TKE   nv          Eccentric TM   nv          Standing hip 3 way   nv          Wall Slides ROM   nv          LAQ     5\" x 10  BFR       Leg press stretch      2 holes seated 60# 10\" x 10  2 holes seated 60# 10\" x 10                                  Modalities             ice   10' 10 NV 10                  "

## 2024-03-18 ENCOUNTER — OFFICE VISIT (OUTPATIENT)
Dept: PHYSICAL THERAPY | Facility: CLINIC | Age: 39
End: 2024-03-18
Payer: COMMERCIAL

## 2024-03-18 DIAGNOSIS — S86.811A RUPTURE OF RIGHT PATELLAR TENDON, INITIAL ENCOUNTER: Primary | ICD-10-CM

## 2024-03-18 PROCEDURE — 97110 THERAPEUTIC EXERCISES: CPT | Performed by: PHYSICAL THERAPIST

## 2024-03-18 PROCEDURE — 97140 MANUAL THERAPY 1/> REGIONS: CPT | Performed by: PHYSICAL THERAPIST

## 2024-03-18 NOTE — PROGRESS NOTES
"Daily Note     Today's date: 3/18/2024  Patient name: Lorenzo Hebert  : 1985  MRN: [id number]  Referring provider: Timmy Vines  Dx: No diagnosis found.               Subjective: Patient reports that he has been icing and feels that he is making progress.        Objective: See treatment diary below      Assessment: Tolerated treatment well. Patient would benefit from continued PT      Plan: Continue per plan of care.      5 x a week x 2 weeks  3 x a week x 2 weeks   2 weeks for 4 weeks      Access Code: [email or web address]  URL: [email or web address]  Date: 2023  Prepared by: Kalpana Nunes    Manuals 2/8 02/15 3/7 3/12 3/13 3/14 3/15 3/18     PROM  flex/ext   20' Prone 10' 10 prone 10 prone CHRISTOPHER 10'  10'     FOTO             REV   30          JM mobs   nv 5'                                                                          SLS 10\" x 10 foam  10\" x 10 foam  nv 10 x :10                                   TG        10x :10     Heel slides   10 x :10 10 x :10  10\" x 10  On wall 5' 5# 5' 5#      Bike for ROM  10  10 min 10' 10' 10  10  10' 15'     Seated static knee flexion st (PROM) 10  10  Min  10x dc DC        Standing knee flexion - at wall on step (lunge) 30\" x 10   nv 10 x :10  10\" x 10  10s x20 20 x :10     Prone quad stretch      20\" x 5  20s x5 5 x :20     Sit to stand knee flexion stretch       10\" x 10        SLR   x10 x10 BFR        QS  \ 10 x :05 10 x :05         SAQ   10 x :05 10 x :05         Quadraped       5s x20 20 x :05     SL ABD    nv          BFR with SLR   nv          Mini squats   10     10     TKE   nv          Eccentric TM   nv          Standing hip 3 way   nv                       LAQ     5\" x 10  BFR       Leg press stretch      2 holes seated 60# 10\" x 10  2 holes seated 60# 10\" x 10                                  Modalities             ice   10' 10 NV 10  10'                                                                             "

## 2024-03-20 ENCOUNTER — APPOINTMENT (OUTPATIENT)
Dept: PHYSICAL THERAPY | Facility: CLINIC | Age: 39
End: 2024-03-20
Payer: COMMERCIAL

## 2024-03-21 ENCOUNTER — OFFICE VISIT (OUTPATIENT)
Dept: PHYSICAL THERAPY | Facility: CLINIC | Age: 39
End: 2024-03-21
Payer: COMMERCIAL

## 2024-03-21 DIAGNOSIS — S86.811A RUPTURE OF RIGHT PATELLAR TENDON, INITIAL ENCOUNTER: Primary | ICD-10-CM

## 2024-03-21 PROCEDURE — 97112 NEUROMUSCULAR REEDUCATION: CPT

## 2024-03-21 PROCEDURE — 97110 THERAPEUTIC EXERCISES: CPT

## 2024-03-21 PROCEDURE — 97140 MANUAL THERAPY 1/> REGIONS: CPT

## 2024-03-21 NOTE — PROGRESS NOTES
"Daily Note     Today's date: 3/21/2024  Patient name: Lorenzo Hebert  : 1985  MRN: [id number]  Referring provider: Timmy Vines  Dx:   Encounter Diagnosis     ICD-10-CM    1. Rupture of right patellar tendon, initial encounter  S86.811A                      Subjective: Patient ambulating with AC's per PT (Eileen based ) suggestion to normalize gait pattern.       Objective: See treatment diary below      Assessment: Tolerated treatment well. Patient exhibited good technique with therapeutic exercises      Plan: Continue per plan of care.      5 x a week x 2 weeks  3 x a week x 2 weeks   2 weeks for 4 weeks      Access Code: [email or web address]  URL: [email or web address]  Date: 2023  Prepared by: Kalpana Nunes    Manuals 2/8 02/15 3/7 3/12 3/13 3/14 3/15 3/18 3/21    PROM  flex/ext   20' Prone 10' 10 prone 10 prone CHRISTOPHER 10'  10' 10     FOTO             REV   30          JM mobs   nv 5'                                                                          SLS 10\" x 10 foam  10\" x 10 foam  nv 10 x :10                                   TG        10x :10     Heel slides   10 x :10 10 x :10  10\" x 10  On wall 5' 5# 5' 5#  10# 5 min    Bike for ROM  10  10 min 10' 10' 10  10  10' 15' 15    Seated static knee flexion st (PROM) 10  10  Min  10x dc DC        Standing knee flexion - at wall on step (lunge) 30\" x 10   nv 10 x :10  10\" x 10  10s x20 20 x :10 10\"x 20     Prone quad stretch      20\" x 5  20s x5 5 x :20 5 x 20\"     Sit to stand knee flexion stretch       10\" x 10    10\" x 10     SLR   x10 x10 BFR        QS  \ 10 x :05 10 x :05         SAQ   10 x :05 10 x :05         Quadraped       5s x20 20 x :05 5\" x 20     SL ABD    nv          BFR with SLR   nv          Mini squats   10     10  10    TKE   nv          Eccentric TM   nv          Standing hip 3 way   nv                       LAQ     5\" x 10  BFR       Leg press stretch      2 holes seated 60# 10\" x 10  2 holes seated 60# 10\" x 10    2 " "holes seated 10\" x 10 60#                              Modalities             ice   10' 10 NV 10  10'                                                                               "

## 2024-03-22 ENCOUNTER — OFFICE VISIT (OUTPATIENT)
Dept: PHYSICAL THERAPY | Facility: CLINIC | Age: 39
End: 2024-03-22
Payer: COMMERCIAL

## 2024-03-22 DIAGNOSIS — S86.811A RUPTURE OF RIGHT PATELLAR TENDON, INITIAL ENCOUNTER: Primary | ICD-10-CM

## 2024-03-22 PROCEDURE — 97110 THERAPEUTIC EXERCISES: CPT | Performed by: PHYSICAL THERAPIST

## 2024-03-22 PROCEDURE — 97140 MANUAL THERAPY 1/> REGIONS: CPT | Performed by: PHYSICAL THERAPIST

## 2024-03-22 NOTE — PROGRESS NOTES
"Daily Note     Today's date: 3/22/2024  Patient name: Lorenzo Hebert  : 1985  MRN: [id number]  Referring provider: Timmy Vines  Dx:   Encounter Diagnosis     ICD-10-CM    1. Rupture of right patellar tendon, initial encounter  S86.811A                      Subjective: No new complaints      Objective: See treatment diary below  Knee flexion - 91    Assessment: Muscle guarding persists limiting flexion       Plan: Continue per plan of care.      5 x a week x 2 weeks  3 x a week x 2 weeks   2 weeks for 4 weeks      Access Code: [email or web address]  URL: [email or web address]  Date: 2023  Prepared by: Kalpana Nunes    Manuals 2/8 02/15 3/7 3/12 3/13 3/14 3/15 3/18 3/21 3/22   PROM  flex/ext   20' Prone 10' 10 prone 10 prone CHRISTOPHER 10'  10' 10     FOTO             REV   30          JM mobs   nv 5'                                                                          SLS 10\" x 10 foam  10\" x 10 foam  nv 10 x :10                                   TG        10x :10     Heel slides   10 x :10 10 x :10  10\" x 10  On wall 5' 5# 5' 5#  10# 5 min    Bike for ROM  10  10 min 10' 10' 10  10  10' 15' 15 10'   Seated static knee flexion st (PROM) 10  10  Min  10x dc DC        Standing knee flexion - at wall on step (lunge) 30\" x 10   nv 10 x :10  10\" x 10  10s x20 20 x :10 10\"x 20  10s x20   Prone quad stretch      20\" x 5  20s x5 5 x :20 5 x 20\"  20s x5   Sit to stand knee flexion stretch       10\" x 10    10\" x 10     SLR   x10 x10 BFR        QS  \ 10 x :05 10 x :05         SAQ   10 x :05 10 x :05         Quadraped       5s x20 20 x :05 5\" x 20  5s x20   SL ABD    nv          BFR with SLR   nv          Mini squats   10     10  10 2x10   TKE   nv          Eccentric TM   nv          Standing hip 3 way   nv                       LAQ     5\" x 10  BFR       Leg press stretch      2 holes seated 60# 10\" x 10  2 holes seated 60# 10\" x 10    2 holes seated 10\" x 10 60# 70# 1 hole 10s x10                           "   Modalities             ice   10' 10 NV 10  10'

## 2024-03-28 ENCOUNTER — OFFICE VISIT (OUTPATIENT)
Dept: PHYSICAL THERAPY | Facility: CLINIC | Age: 39
End: 2024-03-28
Payer: COMMERCIAL

## 2024-03-28 DIAGNOSIS — S86.811A RUPTURE OF RIGHT PATELLAR TENDON, INITIAL ENCOUNTER: Primary | ICD-10-CM

## 2024-03-28 PROCEDURE — 97110 THERAPEUTIC EXERCISES: CPT

## 2024-03-28 PROCEDURE — 97140 MANUAL THERAPY 1/> REGIONS: CPT

## 2024-03-28 NOTE — PROGRESS NOTES
"Daily Note     Today's date: 3/28/2024  Patient name: Lorenzo Hebert  : 1985  MRN: [id number]  Referring provider: Timmy Vines  Dx:   Encounter Diagnosis     ICD-10-CM    1. Rupture of right patellar tendon, initial encounter  S86.811A                      Subjective: patient has noticed an improvement in gait pattern      Objective: See treatment diary below      Assessment: Tolerated treatment well. Patient exhibited good technique with therapeutic exercises      Plan: Continue per plan of care.      5 x a week x 2 weeks  3 x a week x 2 weeks   2 weeks for 4 weeks      Access Code: [email or web address]  URL: [email or web address]  Date: 2023  Prepared by: Kalpana Nunes    Manuals 3/26 3/28      3/15 3/18 3/21 3/22   PROM  flex 10 seated  10 seated      CHRISTOPHER 10'  10' 10     FOTO             REV             JM mobs             Supine flexion  100 degrees!                                                               SLS                                       TG        10x :10     Heel slides 10# 5 min  10# 7 min      On wall 5' 5# 5' 5#  10# 5 min    Bike for ROM  10 10/10     10' 15' 15 10'   Seated static knee flexion st (PROM)             Standing knee flexion - at wall on step (lunge) 10\" x 20  10\" x 20      10s x20 20 x :10 10\"x 20  10s x20   Prone quad stretch  10\" x 20      20s x5 5 x :20 5 x 20\"  20s x5   Sit to stand knee flexion stretch  10\" x 10  10\" x 10        10\" x 10     SLR             QS             SAQ             Quadraped 5\" x 20  5\" x 20      5s x20 20 x :05 5\" x 20  5s x20   SL ABD              BFR with SLR             Mini squats  2 x 10       10  10 2x10   TKE             Eccentric TM             Standing hip 3 way                          LAQ             Leg press stretch  70# 1 hole 10\" x 10  75# 1 hole 10\" x 10       2 holes seated 10\" x 10 60# 70# 1 hole 10s x10                             Modalities             ice  10 10' 10 NV 10  10'                "

## 2024-04-02 ENCOUNTER — OFFICE VISIT (OUTPATIENT)
Dept: PHYSICAL THERAPY | Facility: CLINIC | Age: 39
End: 2024-04-02
Payer: COMMERCIAL

## 2024-04-02 DIAGNOSIS — S86.811A RUPTURE OF RIGHT PATELLAR TENDON, INITIAL ENCOUNTER: Primary | ICD-10-CM

## 2024-04-02 PROCEDURE — 97110 THERAPEUTIC EXERCISES: CPT

## 2024-04-02 PROCEDURE — 97140 MANUAL THERAPY 1/> REGIONS: CPT

## 2024-04-02 NOTE — PROGRESS NOTES
"Daily Note     Today's date: 2024  Patient name: Lorenzo Hebert  : 1985  MRN: [id number]  Referring provider: Timmy Vines  Dx:   Encounter Diagnosis     ICD-10-CM    1. Rupture of right patellar tendon, initial encounter  S86.811A                      Subjective: patient able to make a full revolution on stationary bike to begin treatment. ROM continues to improve and able to maintain gains in ROM      Objective: See treatment diary below      Assessment: Tolerated treatment well. Patient exhibited good technique with therapeutic exercises and would benefit from continued PT      Plan: Continue per plan of care.      5 x a week x 2 weeks  3 x a week x 2 weeks   2 weeks for 4 weeks      Access Code: [email or web address]  URL: [email or web address]  Date: 2023  Prepared by: Kalpana Nunes    Manuals 3/26 3/28  4          PROM  flex 10 seated  10 seated  10 min prone          FOTO             REV             JM mobs             Supine flexion  100 degrees!                                                               SLS                                       TG             Heel slides 10# 5 min  10# 7 min  10# 10 min           Bike for ROM  10 10/10 10/10          Seated static knee flexion st (PROM)             Standing knee flexion - at wall on step (lunge) 10\" x 20  10\" x 20            Prone quad stretch  10\" x 20  10\" x 20           Sit to stand knee flexion stretch  10\" x 10  10\" x 10  10\" x 10           SLR             QS             SAQ             Quadraped 5\" x 20  5\" x 20  5\" x 20           SL ABD              BFR with SLR             Mini squats  2 x 10  2 x 10           TKE             Eccentric TM             Standing hip 3 way             SLR flexion   2 x 10           LAQ             Leg press stretch  70# 1 hole 10\" x 10  75# 1 hole 10\" x 10 75# 1 hole 10\" x 10                                     Modalities             ice  10                      "

## 2024-04-04 ENCOUNTER — APPOINTMENT (OUTPATIENT)
Dept: PHYSICAL THERAPY | Facility: CLINIC | Age: 39
End: 2024-04-04
Payer: COMMERCIAL

## 2024-04-05 ENCOUNTER — OFFICE VISIT (OUTPATIENT)
Dept: PHYSICAL THERAPY | Facility: CLINIC | Age: 39
End: 2024-04-05
Payer: COMMERCIAL

## 2024-04-05 DIAGNOSIS — S86.811A RUPTURE OF RIGHT PATELLAR TENDON, INITIAL ENCOUNTER: Primary | ICD-10-CM

## 2024-04-05 PROCEDURE — 97110 THERAPEUTIC EXERCISES: CPT | Performed by: PHYSICAL THERAPIST

## 2024-04-05 PROCEDURE — 97140 MANUAL THERAPY 1/> REGIONS: CPT | Performed by: PHYSICAL THERAPIST

## 2024-04-05 NOTE — PROGRESS NOTES
"Daily Note     Today's date: 2024  Patient name: Lorenzo Hebert  : 1985  MRN: [id number]  Referring provider: Timmy Vines  Dx:   Encounter Diagnosis     ICD-10-CM    1. Rupture of right patellar tendon, initial encounter  S86.811A                      Subjective: He states he was able to achieve 104 deg flexion the other day and he is feeling better.       Objective: See treatment diary below      Assessment: Tolerated treatment well.His strength is steadily improving and may benefit from lunges c UE assistance to improve his strength.  Patient exhibited good technique with therapeutic exercises and would benefit from continued PT      Plan: Continue per plan of care.      5 x a week x 2 weeks  3 x a week x 2 weeks   2 weeks for 4 weeks      Access Code: [email or web address]  URL: [email or web address]  Date: 2023  Prepared by: Kalpana Nunes    Manuals 3/26 3/28  4/2 4/5         PROM  flex 10 seated  10 seated  10 min prone 10 min prone         FOTO             REV             JM mobs             Supine flexion  100 degrees!                                                               SLS                                       TG             Heel slides 10# 5 min  10# 7 min  10# 10 min  10# 10 min         Bike for ROM  10 10/10 10/10 10/10         Seated static knee flexion st (PROM)             Standing knee flexion - at wall on step (lunge) 10\" x 20  10\" x 20            Prone quad stretch  10\" x 20  10\" x 20  10\"x20         Sit to stand knee flexion stretch  10\" x 10  10\" x 10  10\" x 10  10x :10         SLR             QS             SAQ             Quadraped 5\" x 20  5\" x 20  5\" x 20  20x :05         SL ABD              BFR with SLR             Mini squats  2 x 10  2 x 10  2x10         TKE             Eccentric TM             Standing hip 3 way             SLR flexion   2 x 10  2x10         LAQ             Leg press stretch  70# 1 hole 10\" x 10  75# 1 hole 10\" x 10 75# 1 hole 10\" x 10 " " #75 1 whole 10\"x10                                   Modalities             ice  10                                                                                             "

## 2024-04-10 ENCOUNTER — APPOINTMENT (OUTPATIENT)
Dept: PHYSICAL THERAPY | Facility: CLINIC | Age: 39
End: 2024-04-10
Payer: COMMERCIAL

## (undated) DEVICE — INTENDED FOR TISSUE SEPARATION, AND OTHER PROCEDURES THAT REQUIRE A SHARP SURGICAL BLADE TO PUNCTURE OR CUT.: Brand: BARD-PARKER SAFETY BLADES SIZE 10, STERILE

## (undated) DEVICE — ACE WRAP 6 IN STERILE

## (undated) DEVICE — IMPERVIOUS STOCKINETTE: Brand: DEROYAL

## (undated) DEVICE — PAD CAST 4 IN COTTON NON STERILE

## (undated) DEVICE — SUT VICRYL 0 CT-1 27 IN J260H

## (undated) DEVICE — COBAN 4 IN STERILE

## (undated) DEVICE — GLOVE INDICATOR PI UNDERGLOVE SZ 8 BLUE

## (undated) DEVICE — ACE WRAP 6 IN UNSTERILE

## (undated) DEVICE — ACE WRAP 4 IN STERILE

## (undated) DEVICE — OCCLUSIVE GAUZE STRIP,3% BISMUTH TRIBROMOPHENATE IN PETROLATUM BLEND: Brand: XEROFORM

## (undated) DEVICE — ABDOMINAL PAD: Brand: DERMACEA

## (undated) DEVICE — SKN PRP WNG SPNGE PVP SCRB STR: Brand: MEDLINE INDUSTRIES, INC.

## (undated) DEVICE — ADHESIVE SKIN HIGH VISCOSITY EXOFIN 1ML

## (undated) DEVICE — PROXIMATE SKIN STAPLERS (35 WIDE) CONTAINS 35 STAINLESS STEEL STAPLES (FIXED HEAD): Brand: PROXIMATE

## (undated) DEVICE — BANDAGE, ESMARK LF STR 6"X9' (20/CS): Brand: CYPRESS

## (undated) DEVICE — GAUZE SPONGES,16 PLY: Brand: CURITY

## (undated) DEVICE — ACL DISPOSABLES KIT 18 INCHES BAYONET POINT PASSING PIN (2.4 X 45 CM), 14 INCHES DRILL TIP PASSING PIN (2.4 X 35 CM), 1.1 MM X 15 INCHES (38 CM) NITINOL GUIDEWIRE, THREADED TIBIAL CANNULA, MALLEABLE GRAFT RETRACTOR, MARKING PEN, RULER (PERCENT ACCURACY = PLUS OR MINUS 1 PERCENT)

## (undated) DEVICE — PADDING CAST 4 IN  COTTON STRL

## (undated) DEVICE — BETHLEHEM UNIVERSAL  MIONR EXT: Brand: CARDINAL HEALTH

## (undated) DEVICE — GLOVE SRG BIOGEL ECLIPSE 8

## (undated) DEVICE — PENCIL ELECTROSURG E-Z CLEAN -0035H

## (undated) DEVICE — CHLORAPREP HI-LITE 26ML ORANGE

## (undated) DEVICE — SUT VICRYL 2-0 CT-2 27 IN J269H

## (undated) DEVICE — HEWSON SUTURE RETRIEVER: Brand: HEWSON SUTURE RETRIEVER

## (undated) DEVICE — SUT ETHIBOND 5 V-37 30 IN MB66G